# Patient Record
Sex: FEMALE | Race: WHITE | NOT HISPANIC OR LATINO | Employment: OTHER | URBAN - METROPOLITAN AREA
[De-identification: names, ages, dates, MRNs, and addresses within clinical notes are randomized per-mention and may not be internally consistent; named-entity substitution may affect disease eponyms.]

---

## 2017-01-05 ENCOUNTER — HOSPITAL ENCOUNTER (OUTPATIENT)
Dept: RADIOLOGY | Facility: HOSPITAL | Age: 62
Discharge: HOME | End: 2017-01-05
Attending: EMERGENCY MEDICINE
Payer: COMMERCIAL

## 2017-01-05 DIAGNOSIS — Z12.39 BREAST CANCER SCREENING: ICD-10-CM

## 2017-01-05 DIAGNOSIS — Z12.31 VISIT FOR SCREENING MAMMOGRAM: ICD-10-CM

## 2017-01-05 PROCEDURE — 77063 BREAST TOMOSYNTHESIS BI: CPT | Mod: 26,,, | Performed by: RADIOLOGY

## 2017-01-05 PROCEDURE — 77067 SCR MAMMO BI INCL CAD: CPT | Mod: TC

## 2017-01-05 PROCEDURE — 77067 SCR MAMMO BI INCL CAD: CPT | Mod: 26,,, | Performed by: RADIOLOGY

## 2017-03-13 ENCOUNTER — OFFICE VISIT (OUTPATIENT)
Dept: FAMILY MEDICINE | Facility: CLINIC | Age: 62
End: 2017-03-13
Payer: COMMERCIAL

## 2017-03-13 VITALS
OXYGEN SATURATION: 97 % | BODY MASS INDEX: 34.16 KG/M2 | HEART RATE: 70 BPM | WEIGHT: 217.63 LBS | HEIGHT: 67 IN | DIASTOLIC BLOOD PRESSURE: 90 MMHG | TEMPERATURE: 99 F | SYSTOLIC BLOOD PRESSURE: 130 MMHG

## 2017-03-13 DIAGNOSIS — J32.9 RHINOSINUSITIS: Primary | ICD-10-CM

## 2017-03-13 DIAGNOSIS — M26.609 TEMPOROMANDIBULAR JOINT DISORDER: ICD-10-CM

## 2017-03-13 DIAGNOSIS — R03.0 ELEVATED BLOOD PRESSURE READING: ICD-10-CM

## 2017-03-13 PROCEDURE — 99214 OFFICE O/P EST MOD 30 MIN: CPT | Mod: S$GLB,,, | Performed by: NURSE PRACTITIONER

## 2017-03-13 PROCEDURE — 99999 PR PBB SHADOW E&M-EST. PATIENT-LVL III: CPT | Mod: PBBFAC,,, | Performed by: NURSE PRACTITIONER

## 2017-03-13 PROCEDURE — 1160F RVW MEDS BY RX/DR IN RCRD: CPT | Mod: S$GLB,,, | Performed by: NURSE PRACTITIONER

## 2017-03-13 RX ORDER — CYCLOBENZAPRINE HCL 5 MG
5 TABLET ORAL 3 TIMES DAILY PRN
Qty: 20 TABLET | Refills: 0 | Status: SHIPPED | OUTPATIENT
Start: 2017-03-13 | End: 2017-03-23

## 2017-03-13 RX ORDER — AZITHROMYCIN 250 MG/1
TABLET, FILM COATED ORAL
Qty: 6 TABLET | Refills: 0 | Status: SHIPPED | OUTPATIENT
Start: 2017-03-13 | End: 2017-03-18

## 2017-03-13 NOTE — PROGRESS NOTES
Subjective:       Patient ID: Leny Tucker is a 61 y.o. female.    Chief Complaint: Nasal Congestion (yellow mucus x 3 days); Cough; and Otalgia    HPI   Patient in office with complaints of non productive cough, nasal congestion, itchy eyes, body aches over the last week. Sx have abruptly worsened in the last 3 days.   currently taking Tylenol, Zyrtec D, and Flonase daily with no sx relief   Denies fever, shortness of breath, chest pain.     States that she has a hx of TMJ, Right sided jaw pain x 1 month   Currently taking tylenol/advil for sx relief, with moderate pain relief   She has taken flexeril in the past with similar sx and requests a trial of the medication.     Elevated b/p reading - no hx of htn, not currently on any medications. She has been taking sudafed for current sx.     Review of Systems   Constitutional: Negative for chills and fever.   HENT: Positive for congestion, rhinorrhea and sore throat. Negative for ear pain and postnasal drip.    Eyes: Positive for itching. Negative for discharge and redness.   Respiratory: Negative for shortness of breath and wheezing.    Cardiovascular: Negative for chest pain, palpitations and leg swelling.   Gastrointestinal: Negative for constipation, diarrhea, nausea and vomiting.   Genitourinary: Negative for difficulty urinating.   Musculoskeletal: Positive for arthralgias.   Skin: Negative for rash.   Neurological: Positive for headaches. Negative for speech difficulty and weakness.   Psychiatric/Behavioral: Positive for sleep disturbance.       Objective:      Physical Exam   Constitutional: She is oriented to person, place, and time. She appears well-developed and well-nourished.   HENT:   Head: Normocephalic and atraumatic.   Right Ear: Hearing, external ear and ear canal normal. A middle ear effusion is present.   Left Ear: Hearing, external ear and ear canal normal. A middle ear effusion is present.   Nose: Nose normal.   Mouth/Throat: Uvula is midline,  oropharynx is clear and moist and mucous membranes are normal.       Eyes: Conjunctivae and EOM are normal. Pupils are equal, round, and reactive to light.   Neck: Normal range of motion. Neck supple.   Cardiovascular: Normal rate, regular rhythm, normal heart sounds and intact distal pulses.    Pulmonary/Chest: Effort normal and breath sounds normal.   Abdominal: Soft. Bowel sounds are normal.   Musculoskeletal: Normal range of motion.   Neurological: She is alert and oriented to person, place, and time.   Skin: Skin is warm and dry.   Psychiatric: She has a normal mood and affect. Her behavior is normal. Judgment and thought content normal.   Nursing note and vitals reviewed.      Assessment:       1. Rhinosinusitis    2. Temporomandibular joint disorder    3. Elevated blood pressure reading        Plan:   Leny was seen today for nasal congestion, cough and otalgia.    Diagnoses and all orders for this visit:    Rhinosinusitis  -     azithromycin (Z-VALENTIN) 250 MG tablet; Take 2 tablets by mouth on day 1; Take 1 tablet by mouth on days 2-5  Side effects and precautions of abx use reviewed with patient, expressed understanding. No questions or concerns.  Expectant management reviewed: increase fluid intake, otc analgesics prn, mucinex and antihistamines for sx relief. Call if sx persist or worsen.     Temporomandibular joint disorder  -     cyclobenzaprine (FLEXERIL) 5 MG tablet; Take 1 tablet (5 mg total) by mouth 3 (three) times daily as needed for Muscle spasms.  Side effects and precautions of medication use reviewed with patient, expressed understanding. No questions or concerns.  Rest, nsaids. May need to follow-up with dentist for persistent sx.     Elevated blood pressure reading  Avoid decongestants. Home b/p monitoring.   RTC to clinic for persistent elevations > 140/90.

## 2017-03-13 NOTE — MR AVS SNAPSHOT
[facility]  [address]  [facility] [address]  Phone: [phone]  Fax: [phone]                  Leny Tucker   3/13/2017 10:40 AM   Office Visit    Description:  Female : 1955   Provider:  Leny Peters NP   Department:  [facility]           Reason for Visit     Nasal Congestion     Cough     Otalgia           Diagnoses this Visit        Comments    Rhinosinusitis    -  Primary     Temporomandibular joint disorder         Elevated blood pressure reading                To Do List           Goals (5 Years of Data)              Today    Today    16    Blood Pressure < 130/80   130/90  130/90  130/90       These Medications        Disp Refills Start End    azithromycin (Z-VALENTIN) 250 MG tablet 6 tablet 0 3/13/2017 3/18/2017    Take 2 tablets by mouth on day 1; Take 1 tablet by mouth on days 2-5    Pharmacy: [facility] [employer] [address] [place] [address] AT [facility] Ph #: [phone]       cyclobenzaprine (FLEXERIL) 5 MG tablet 20 tablet 0 3/13/2017 3/23/2017    Take 1 tablet (5 mg total) by mouth 3 (three) times daily as needed for Muscle spasms. - Oral    Pharmacy: [facility] [place] [address] AT [address] [facility] & [facility] Ph #: [phone]         [facility]     [facility] Nurse Care Line -  Assistance  Registered nurses in the [facility] provide clinical advisement, health education, appointment booking, and other advisory services.  Call for this free service at [phone].             Medications           Message regarding Medications     Verify the changes and/or additions to your medication regime listed below are the same as discussed with your clinician today.  If any of these changes or additions are incorrect, please notify your healthcare provider.        START taking these NEW medications        Refills    azithromycin  "(Z-VALENTIN) 250 MG tablet 0    Sig: Take 2 tablets by mouth on day 1; Take 1 tablet by mouth on days 2-5    Class: Normal    cyclobenzaprine (FLEXERIL) 5 MG tablet 0    Sig: Take 1 tablet (5 mg total) by mouth 3 (three) times daily as needed for Muscle spasms.    Class: Normal    Route: Oral           Verify that the below list of medications is an accurate representation of the medications you are currently taking.  If none reported, the list may be blank. If incorrect, please contact your healthcare provider. Carry this list with you in case of emergency.           Current Medications     CETIRIZINE HCL/PSEUDOEPHEDRINE (ZYRTEC-D ORAL) Take 1 tablet by mouth once daily.    diclofenac sodium (VOLTAREN) 1 % Gel Apply 2 g topically 4 (four) times daily.    fluticasone (FLONASE) 50 mcg/actuation nasal spray 2 sprays by Each Nare route once daily.    ibuprofen (ADVIL,MOTRIN) 200 MG tablet Take 200 mg by mouth every 6 (six) hours as needed for Pain.    ketoconazole (NIZORAL) 2 % shampoo Apply topically once daily.    multivitamin capsule Take 1 capsule by mouth once daily.    ranitidine (ZANTAC) 150 MG tablet Take 1 tablet (150 mg total) by mouth 2 (two) times daily.    triamcinolone acetonide 0.1% (KENALOG) 0.1 % Lotn Apply topically 2 (two) times daily.    azithromycin (Z-VALENTIN) 250 MG tablet Take 2 tablets by mouth on day 1; Take 1 tablet by mouth on days 2-5    cyclobenzaprine (FLEXERIL) 5 MG tablet Take 1 tablet (5 mg total) by mouth 3 (three) times daily as needed for Muscle spasms.           Clinical Reference Information           Your Vitals Were     BP Pulse Temp Height Weight SpO2    130/90 (BP Location: Left arm, Patient Position: Sitting, BP Method: Manual) 70 99.1 °F (37.3 °C) (Oral) 5' 7" (1.702 m) 98.7 kg (217 lb 9.5 oz) 97%    BMI                34.08 kg/m2          Blood Pressure          Most Recent Value    BP  (!)  130/90      Allergies as of 3/13/2017     Augmentin [Amoxicillin-pot Clavulanate]    Sulfa " (Sulfonamide Antibiotics)      Immunizations Administered on Date of Encounter - 3/13/2017     None      Language Assistance Services     ATTENTION: Language assistance services are available, free of charge. Please call [phone].      ATENCIÓN: Si vannessa nielson, tiene a galvin disposición servicios gratuitos de asistencia lingüística. Llame al [phone].     [facility] Ý: N?u b?n nói Ti?ng Vi?t, có các d?ch v? h? tr? ngôn ng? mi?n phí dành cho b?n. G?i s? [phone].         [facility] complies with applicable Federal civil rights laws and does not discriminate on the basis of race, color, national origin, age, disability, or sex.

## 2017-05-17 ENCOUNTER — HOSPITAL ENCOUNTER (OUTPATIENT)
Dept: RADIOLOGY | Facility: HOSPITAL | Age: 62
Discharge: HOME | End: 2017-05-17
Attending: NURSE PRACTITIONER
Payer: COMMERCIAL

## 2017-05-17 ENCOUNTER — OFFICE VISIT (OUTPATIENT)
Dept: FAMILY MEDICINE | Facility: CLINIC | Age: 62
End: 2017-05-17
Payer: COMMERCIAL

## 2017-05-17 VITALS
HEART RATE: 82 BPM | HEIGHT: 67 IN | RESPIRATION RATE: 98 BRPM | BODY MASS INDEX: 34.88 KG/M2 | SYSTOLIC BLOOD PRESSURE: 150 MMHG | TEMPERATURE: 99 F | WEIGHT: 222.25 LBS | DIASTOLIC BLOOD PRESSURE: 100 MMHG

## 2017-05-17 DIAGNOSIS — G89.29 CHRONIC PAIN OF RIGHT KNEE: ICD-10-CM

## 2017-05-17 DIAGNOSIS — R03.0 ELEVATED BLOOD PRESSURE READING WITHOUT DIAGNOSIS OF HYPERTENSION: ICD-10-CM

## 2017-05-17 DIAGNOSIS — R50.9 FEVER, UNSPECIFIED FEVER CAUSE: Primary | ICD-10-CM

## 2017-05-17 DIAGNOSIS — M25.561 CHRONIC PAIN OF RIGHT KNEE: ICD-10-CM

## 2017-05-17 DIAGNOSIS — J01.10 ACUTE NON-RECURRENT FRONTAL SINUSITIS: ICD-10-CM

## 2017-05-17 DIAGNOSIS — J02.9 SORE THROAT: ICD-10-CM

## 2017-05-17 DIAGNOSIS — J34.89 SINUS PRESSURE: ICD-10-CM

## 2017-05-17 PROCEDURE — 73562 X-RAY EXAM OF KNEE 3: CPT | Mod: TC,PO,RT

## 2017-05-17 PROCEDURE — 1160F RVW MEDS BY RX/DR IN RCRD: CPT | Mod: S$GLB,,, | Performed by: NURSE PRACTITIONER

## 2017-05-17 PROCEDURE — 99999 PR PBB SHADOW E&M-EST. PATIENT-LVL IV: CPT | Mod: PBBFAC,,, | Performed by: NURSE PRACTITIONER

## 2017-05-17 PROCEDURE — 99214 OFFICE O/P EST MOD 30 MIN: CPT | Mod: S$GLB,,, | Performed by: NURSE PRACTITIONER

## 2017-05-17 PROCEDURE — 73562 X-RAY EXAM OF KNEE 3: CPT | Mod: 26,RT,, | Performed by: RADIOLOGY

## 2017-05-17 RX ORDER — DOXYCYCLINE 100 MG/1
100 CAPSULE ORAL 2 TIMES DAILY
Qty: 14 CAPSULE | Refills: 0 | Status: SHIPPED | OUTPATIENT
Start: 2017-05-17 | End: 2017-05-24

## 2017-05-17 RX ORDER — METHYLPREDNISOLONE 4 MG/1
TABLET ORAL
Qty: 1 PACKAGE | Refills: 0 | Status: SHIPPED | OUTPATIENT
Start: 2017-05-17 | End: 2017-06-01 | Stop reason: ALTCHOICE

## 2017-05-17 NOTE — PROGRESS NOTES
Subjective:       Patient ID: Leny Tucker is a 61 y.o. female.    Chief Complaint: Sinus Problem and Knee Pain (left knee)  She was last seen in primary care by FRANCO Peters NP on 03/13/2017. She last saw her PCP Dr. Sullivan on 12/16/2016. This is her first time seeing me in the clinic.  Sinus Problem   The current episode started 1 to 4 weeks ago (3 weeks). The problem has been rapidly worsening since onset. Maximum temperature: 99 at home. Associated symptoms include congestion, sinus pressure and a sore throat. Treatments tried: flonase, robitussin,    Knee Pain    The incident occurred more than 1 week ago (3 weeks). Injury mechanism: states injury in the 80's. The pain is present in the right knee. The quality of the pain is described as aching. The pain is at a severity of 8/10. The pain is moderate. The pain has been intermittent since onset. The symptoms are aggravated by weight bearing (bending knee). Treatments tried: voltaren. The treatment provided mild relief.   Not responding to OTC meds for sinus complaints for 3 weeks  Vitals:    05/17/17 0854   BP: (!) 150/100   Pulse: 82   Resp: (!) 98   Temp: 99.3 °F (37.4 °C)     BP Readings from Last 3 Encounters:   05/17/17 (!) 150/100   03/13/17 (!) 130/90   12/16/16 120/80     Review of Systems   HENT: Positive for congestion, sinus pressure and sore throat.      She states blood pressure elevation related to pain in knee and over-the-counter medications she is taking.  Objective:      Physical Exam   Constitutional: She is oriented to person, place, and time. She appears well-developed and well-nourished.   HENT:   Head: Normocephalic and atraumatic.   Right Ear: Hearing and external ear normal.   Left Ear: Hearing and external ear normal.   Nose: Sinus tenderness present. Right sinus exhibits frontal sinus tenderness. Left sinus exhibits frontal sinus tenderness.   States headache when puts head down  Tenderness around ethmoid and frontal sinus regions    Cardiovascular: Normal rate and regular rhythm.    Pulmonary/Chest: Effort normal.   Abdominal: Soft.   Musculoskeletal:        Legs:  Lymphadenopathy:        Head (right side): No submental, no submandibular, no tonsillar, no preauricular, no posterior auricular and no occipital adenopathy present.        Head (left side): No submental, no submandibular, no tonsillar, no preauricular, no posterior auricular and no occipital adenopathy present.     She has no cervical adenopathy.   Neurological: She is alert and oriented to person, place, and time.   Skin: Skin is warm, dry and intact.   Psychiatric: She has a normal mood and affect. Her speech is normal and behavior is normal. Judgment and thought content normal. Cognition and memory are normal.   Nursing note and vitals reviewed.      Assessment & Plan:       Fever, unspecified fever cause  -     doxycycline (VIBRAMYCIN) 100 MG Cap; Take 1 capsule (100 mg total) by mouth 2 (two) times daily.  Dispense: 14 capsule; Refill: 0    Elevated blood pressure reading without diagnosis of hypertension    Acute non-recurrent frontal sinusitis  -     methylPREDNISolone (MEDROL DOSEPACK) 4 mg tablet; use as directed  Dispense: 1 Package; Refill: 0    Sore throat  -     POCT Rapid Strep A    Sinus pressure  -     doxycycline (VIBRAMYCIN) 100 MG Cap; Take 1 capsule (100 mg total) by mouth 2 (two) times daily.  Dispense: 14 capsule; Refill: 0  -     methylPREDNISolone (MEDROL DOSEPACK) 4 mg tablet; use as directed  Dispense: 1 Package; Refill: 0    Chronic pain of right knee  -     X-Ray Knee 3 View Right; Future; Expected date: 5/17/17  -     methylPREDNISolone (MEDROL DOSEPACK) 4 mg tablet; use as directed  Dispense: 1 Package; Refill: 0    Stop all OTC meds except coricidin or delsym if needed  I have discussed with her my concerns about her elevated blood pressure reading today.      Return in about 2 weeks (around 5/31/2017), or if symptoms worsen or fail to improve.   See  PCP or Vera in 2 weeks with blood pressure readings  Xray today

## 2017-05-17 NOTE — MR AVS SNAPSHOT
[facility]  [address] [facility] [address]  [facility] [address]  Phone: [phone]  Fax: [phone]                  Leny Tucker   2017 8:40 AM   Office Visit    Description:  Female : 1955   Provider:  Deja Sandoval NP   Department:  [facility]           Reason for Visit     Sinus Problem     Knee Pain           Diagnoses this Visit        Comments    Fever, unspecified fever cause    -  Primary     Elevated blood pressure reading without diagnosis of hypertension         Acute non-recurrent frontal sinusitis         Sore throat         Sinus pressure         Chronic pain of right knee                To Do List           Future Appointments        Provider Department Dept Phone    2017 10:15 AM [facility] XR2 [facility] [phone]    2017 9:40 AM Deja Sandoval NP [facility] [phone]      Goals (5 Years of Data)              Today    3/13/17    3/13/17    Blood Pressure < 130/80   150/100  150/100  150/100      Follow-Up and Disposition     Return in about 2 weeks (around 2017), or if symptoms worsen or fail to improve.       These Medications        Disp Refills Start End    doxycycline (VIBRAMYCIN) 100 MG Cap 14 capsule 0 2017    Take 1 capsule (100 mg total) by mouth 2 (two) times daily. - Oral    Pharmacy: [facility] [employer] [address] [place] [address] AT [address] [facility]  [address] Ph #: [phone]       methylPREDNISolone (MEDROL DOSEPACK) 4 mg tablet 1 Package 0 2017    use as directed    Pharmacy: [facility] [employer] [address] [place] [address] AT [address] [facility]  [address] Ph #: [phone]         [facility]     [facility] On Call Nurse Care Line -  Assistance  Unless otherwise directed by your provider, please contact [facility], our nurse care line that is available for  assistance.     Registered nurses in the [facility] On  Call Center provide: appointment scheduling, clinical advisement, health education, and other advisory services.  Call: [phone] (toll free)               Medications           Message regarding Medications     Verify the changes and/or additions to your medication regime listed below are the same as discussed with your clinician today.  If any of these changes or additions are incorrect, please notify your healthcare provider.        START taking these NEW medications        Refills    doxycycline (VIBRAMYCIN) 100 MG Cap 0    Sig: Take 1 capsule (100 mg total) by mouth 2 (two) times daily.    Class: Normal    Route: Oral    methylPREDNISolone (MEDROL DOSEPACK) 4 mg tablet 0    Sig: use as directed    Class: Normal           Verify that the below list of medications is an accurate representation of the medications you are currently taking.  If none reported, the list may be blank. If incorrect, please contact your healthcare provider. Carry this list with you in case of emergency.           Current Medications     CETIRIZINE HCL/PSEUDOEPHEDRINE (ZYRTEC-D ORAL) Take 1 tablet by mouth once daily.    fluticasone (FLONASE) 50 mcg/actuation nasal spray 2 sprays by Each Nare route once daily.    ibuprofen (ADVIL,MOTRIN) 200 MG tablet Take 200 mg by mouth every 6 (six) hours as needed for Pain.    multivitamin capsule Take 1 capsule by mouth once daily.    ranitidine (ZANTAC) 150 MG tablet Take 1 tablet (150 mg total) by mouth 2 (two) times daily.    triamcinolone acetonide 0.1% (KENALOG) 0.1 % Lotn Apply topically 2 (two) times daily.    diclofenac sodium (VOLTAREN) 1 % Gel Apply 2 g topically 4 (four) times daily.    doxycycline (VIBRAMYCIN) 100 MG Cap Take 1 capsule (100 mg total) by mouth 2 (two) times daily.    ketoconazole (NIZORAL) 2 % shampoo Apply topically once daily.    methylPREDNISolone (MEDROL DOSEPACK) 4 mg tablet use as directed           Clinical Reference Information           Your Vitals Were      "BP Pulse Temp Resp Height Weight    150/100 82 99.3 °F (37.4 °C) (Oral) 98 5' 7" (1.702 m) 100.8 kg (222 lb 3.6 oz)    BMI                34.81 kg/m2          Blood Pressure          Most Recent Value    BP  (!)  150/100      Allergies as of 5/17/2017     Augmentin [Amoxicillin-pot Clavulanate]    Sulfa (Sulfonamide Antibiotics)      Immunizations Administered on Date of Encounter - 5/17/2017     None      Orders Placed During Today's Visit      Normal Orders This Visit    POCT Rapid Strep A     Future Labs/Procedures Expected by Expires    X-Ray Knee 3 View Right  5/17/2017 5/17/2018                  Instructions    Coricidin HBP for any cold symptoms as needed  Delsym cough as needed  To ED for any severe headache, visual impairment, mobility problems    Blood Pressure reading daily and record   Call to move appt up in 1 week if bottom number remains 100 or above    Discharge Instructions: Taking Your Blood Pressure  Blood pressure is the force of blood as it moves from the heart through the blood vessels. You can take your own blood pressure reading using a digital monitor. Take readings as often as your healthcare provider instructs. Take your readings each time in the same way, using the same arm. Here are guidelines for taking your blood pressure.  The American Heart Association (AHA) recommends purchasing a blood pressure monitor that is validated and approved by the Association for the Advancement of Medical Instrumentation, the [place] Hypertension Society, and the International Protocol for the Validation of Automated BP Measuring Devices. If the blood pressure monitor is for a senior adult, a pregnant woman, or a child, make certain it is validated for use with such a population. For the most reliable readings, the AHA recommends an automatic, cuff-style, upper arm (bicep) monitor. The readings from finger and wrist monitors are not as reliable as the upper arm monitor.        Step 1. Relax    · Wait at " least a half hour after smoking, eating, or exercising. Do not drink coffee, tea, soda, or other caffeinated beverages before checking your blood pressure.   · Sit comfortably at a table. Place the monitor near you.  · Rest for a few minutes before you begin.        Step 2. Wrap the cuff    · Place your arm on the table, palm up. Put your arm in a position that is level with your heart. Wrap the cuff around your upper arm, about an inch above your elbow. Its best to wrap the cuff on bare skin, not over clothing.  · Make sure your cuff fits. If it doesnt wrap around your upper arm, order a larger cuff. A cuff that is too large or too small can result in an inaccurate blood pressure reading.           Step 3. Inflate the cuff    · Pump the cuff until the scale reads 200. If you have a self-inflating cuff, push the button that starts the pump.  · The cuff will tighten, then loosen.  · The numbers will change. When they stop changing, your blood pressure reading will appear.  · If you get a reading that is too high or too low for you, relax for a few minutes. Then do the test again.    Step 4. Write down the results  · Write down your blood pressure numbers. Johnny the date and time. Keep your results in one place, such as a notebook.  · Remove the cuff from your arm. Turn off the machine.  · Take the readings with you to your medical appointments.  · If you start a new blood pressure medicine, or change a blood pressure medicine dose, note the day you started the new drug or dosage on your blood pressure recording sheet. This will help your healthcare provider monitor the effect of medication changes.     Date Last Reviewed: 4/27/2016  © 8221-6417 [employer]. [address] [place] [address], [place] [address]. All rights reserved. This information is not intended as a substitute for professional medical care. Always follow your healthcare professional's instructions.             Language Assistance Services      ATTENTION: Language assistance services are available, free of charge. Please call [phone].      ATENCIÓN: Si habla naga, tiene a galvin disposición servicios gratuitos de asistencia lingüística. Llame al [phone].     CHÚ Ý: N?u b?n nói Ti?ng Vi?t, có các d?ch v? h? tr? ngôn ng? mi?n phí dành cho b?n. G?i s? [phone].         [facility] complies with applicable Federal civil rights laws and does not discriminate on the basis of race, color, national origin, age, disability, or sex.

## 2017-05-17 NOTE — PATIENT INSTRUCTIONS
Coricidin HBP for any cold symptoms as needed  Delsym cough as needed  To ED for any severe headache, visual impairment, mobility problems    Blood Pressure reading daily and record   Call to move appt up in 1 week if bottom number remains 100 or above    Discharge Instructions: Taking Your Blood Pressure  Blood pressure is the force of blood as it moves from the heart through the blood vessels. You can take your own blood pressure reading using a digital monitor. Take readings as often as your healthcare provider instructs. Take your readings each time in the same way, using the same arm. Here are guidelines for taking your blood pressure.  The American Heart Association (AHA) recommends purchasing a blood pressure monitor that is validated and approved by the Association for the Advancement of Medical Instrumentation, the [place] Hypertension Society, and the International Protocol for the Validation of Automated BP Measuring Devices. If the blood pressure monitor is for a senior adult, a pregnant woman, or a child, make certain it is validated for use with such a population. For the most reliable readings, the AHA recommends an automatic, cuff-style, upper arm (bicep) monitor. The readings from finger and wrist monitors are not as reliable as the upper arm monitor.        Step 1. Relax    · Wait at least a half hour after smoking, eating, or exercising. Do not drink coffee, tea, soda, or other caffeinated beverages before checking your blood pressure.   · Sit comfortably at a table. Place the monitor near you.  · Rest for a few minutes before you begin.        Step 2. Wrap the cuff    · Place your arm on the table, palm up. Put your arm in a position that is level with your heart. Wrap the cuff around your upper arm, about an inch above your elbow. Its best to wrap the cuff on bare skin, not over clothing.  · Make sure your cuff fits. If it doesnt wrap around your upper arm, order a larger cuff. A cuff that is  too large or too small can result in an inaccurate blood pressure reading.           Step 3. Inflate the cuff    · Pump the cuff until the scale reads 200. If you have a self-inflating cuff, push the button that starts the pump.  · The cuff will tighten, then loosen.  · The numbers will change. When they stop changing, your blood pressure reading will appear.  · If you get a reading that is too high or too low for you, relax for a few minutes. Then do the test again.    Step 4. Write down the results  · Write down your blood pressure numbers. Johnny the date and time. Keep your results in one place, such as a notebook.  · Remove the cuff from your arm. Turn off the machine.  · Take the readings with you to your medical appointments.  · If you start a new blood pressure medicine, or change a blood pressure medicine dose, note the day you started the new drug or dosage on your blood pressure recording sheet. This will help your healthcare provider monitor the effect of medication changes.     Date Last Reviewed: 4/27/2016 © 2000-2016 [employer]. [address] [place] [address], [place] [address]. All rights reserved. This information is not intended as a substitute for professional medical care. Always follow your healthcare professional's instructions.

## 2017-05-18 ENCOUNTER — TELEPHONE (OUTPATIENT)
Dept: FAMILY MEDICINE | Facility: CLINIC | Age: 62
End: 2017-05-18

## 2017-05-18 DIAGNOSIS — G89.29 CHRONIC PAIN OF RIGHT KNEE: ICD-10-CM

## 2017-05-18 DIAGNOSIS — G89.29 CHRONIC PAIN OF RIGHT KNEE: Primary | ICD-10-CM

## 2017-05-18 DIAGNOSIS — M25.561 CHRONIC PAIN OF RIGHT KNEE: ICD-10-CM

## 2017-05-18 DIAGNOSIS — M25.561 CHRONIC PAIN OF RIGHT KNEE: Primary | ICD-10-CM

## 2017-05-18 RX ORDER — MELOXICAM 7.5 MG/1
7.5 TABLET ORAL DAILY
Qty: 30 TABLET | Refills: 1 | Status: SHIPPED | OUTPATIENT
Start: 2017-05-18 | End: 2017-05-18 | Stop reason: SDUPTHER

## 2017-05-18 NOTE — TELEPHONE ENCOUNTER
----- Message from Yojana Shelton sent at 5/18/2017  2:02 PM CDT -----  Contact: [phone] or [phone] or [phone] patient requesting results

## 2017-05-18 NOTE — TELEPHONE ENCOUNTER
----- Message from Santhosh Luque sent at 5/18/2017 11:00 AM CDT -----  Patient is calling for Test results. Please call patient at [phone]. Thanks!

## 2017-05-22 DIAGNOSIS — K21.9 GASTROESOPHAGEAL REFLUX DISEASE, ESOPHAGITIS PRESENCE NOT SPECIFIED: ICD-10-CM

## 2017-05-22 RX ORDER — MELOXICAM 7.5 MG/1
TABLET ORAL
Qty: 90 TABLET | Refills: 1 | Status: SHIPPED | OUTPATIENT
Start: 2017-05-22 | End: 2017-08-14 | Stop reason: SDUPTHER

## 2017-05-22 NOTE — TELEPHONE ENCOUNTER
----- Message from Destinee Davenport sent at 5/22/2017 10:02 AM CDT -----  Patient requested refill on  Zantac, call into [facility]  pharmacy at  below. Please call patient at  [phone] if you have any questions. Thank you.         [facility] [address] [place] [address] AT [facility] & [address] [address]  Phone: [phone] Fax: [phone]

## 2017-06-01 ENCOUNTER — OFFICE VISIT (OUTPATIENT)
Dept: FAMILY MEDICINE | Facility: CLINIC | Age: 62
End: 2017-06-01
Payer: COMMERCIAL

## 2017-06-01 VITALS
DIASTOLIC BLOOD PRESSURE: 90 MMHG | HEIGHT: 67 IN | SYSTOLIC BLOOD PRESSURE: 132 MMHG | HEART RATE: 86 BPM | RESPIRATION RATE: 16 BRPM | BODY MASS INDEX: 34.64 KG/M2 | WEIGHT: 220.69 LBS | TEMPERATURE: 98 F

## 2017-06-01 DIAGNOSIS — E66.09 NON MORBID OBESITY DUE TO EXCESS CALORIES: ICD-10-CM

## 2017-06-01 DIAGNOSIS — M17.0 PRIMARY OSTEOARTHRITIS OF BOTH KNEES: ICD-10-CM

## 2017-06-01 DIAGNOSIS — L55.9 SUNBURN: ICD-10-CM

## 2017-06-01 DIAGNOSIS — R03.0 ELEVATED BLOOD PRESSURE READING WITHOUT DIAGNOSIS OF HYPERTENSION: Primary | ICD-10-CM

## 2017-06-01 PROCEDURE — 99999 PR PBB SHADOW E&M-EST. PATIENT-LVL IV: CPT | Mod: PBBFAC,,, | Performed by: NURSE PRACTITIONER

## 2017-06-01 PROCEDURE — 99213 OFFICE O/P EST LOW 20 MIN: CPT | Mod: S$GLB,,, | Performed by: NURSE PRACTITIONER

## 2017-06-01 NOTE — PROGRESS NOTES
Subjective:       Patient ID: Leny Tucker is a 61 y.o. female.    Chief Complaint: Follow-up (2 wk fu fever)  She was last seen in primary care by me on 05/17/2017 with fever and elevated blood pressure.  HPI   She is here today for a follow up and with continued blood pressure monitoring.  She complains of mild sore throat but no fever  Vitals:    06/01/17 1035   BP:    Pulse:    Resp:    Temp: 98.2 °F (36.8 °C)     BP Readings from Last 3 Encounters:   06/01/17 (!) 132/90   05/17/17 (!) 150/100   03/13/17 (!) 130/90     Review of Systems   HENT: Positive for sore throat.        CMP  Sodium   Date Value Ref Range Status   12/16/2016 141 136 - 145 mmol/L Final     Potassium   Date Value Ref Range Status   12/16/2016 4.0 3.5 - 5.1 mmol/L Final     Chloride   Date Value Ref Range Status   12/16/2016 106 95 - 110 mmol/L Final     CO2   Date Value Ref Range Status   12/16/2016 28 23 - 29 mmol/L Final     Glucose   Date Value Ref Range Status   12/16/2016 83 70 - 110 mg/dL Final     BUN, Bld   Date Value Ref Range Status   12/16/2016 15 6 - 20 mg/dL Final     Creatinine   Date Value Ref Range Status   12/16/2016 0.9 0.5 - 1.4 mg/dL Final     Calcium   Date Value Ref Range Status   12/16/2016 9.1 8.7 - 10.5 mg/dL Final     Total Protein   Date Value Ref Range Status   12/16/2016 6.9 6.0 - 8.4 g/dL Final     Albumin   Date Value Ref Range Status   12/16/2016 3.9 3.5 - 5.2 g/dL Final     Total Bilirubin   Date Value Ref Range Status   12/16/2016 0.4 0.1 - 1.0 mg/dL Final     Comment:     For infants and newborns, interpretation of results should be based  on gestational age, weight and in agreement with clinical  observations.  Premature Infant recommended reference ranges:  Up to 24 hours.............<8.0 mg/dL  Up to 48 hours............<12.0 mg/dL  3-5 days..................<15.0 mg/dL  6-29 days.................<15.0 mg/dL       Alkaline Phosphatase   Date Value Ref Range Status   12/16/2016 90 55 - 135 U/L Final      AST   Date Value Ref Range Status   12/16/2016 22 10 - 40 U/L Final     ALT   Date Value Ref Range Status   12/16/2016 25 10 - 44 U/L Final     Anion Gap   Date Value Ref Range Status   12/16/2016 7 (L) 8 - 16 mmol/L Final     eGFR if    Date Value Ref Range Status   12/16/2016 >60.0 >60 mL/min/1.73 m^2 Final     eGFR if non    Date Value Ref Range Status   12/16/2016 >60.0 >60 mL/min/1.73 m^2 Final     Comment:     Calculation used to obtain the estimated glomerular filtration  rate (eGFR) is the CKD-EPI equation. Since race is unknown   in our information system, the eGFR values for   -American and Non--American patients are given   for each creatinine result.       States she had stomach ache with doxycycline and went to ED and had to stop it.  She is a non smoker and never been diagnosed prior with hypertension  Objective:      Physical Exam   Constitutional: She is oriented to person, place, and time. She appears well-developed and well-nourished.   HENT:   Head: Normocephalic and atraumatic.   Right Ear: External ear normal.   Left Ear: External ear normal.   Nose: Nose normal.   Mouth/Throat: Uvula is midline, oropharynx is clear and moist and mucous membranes are normal.   Neck: Normal range of motion. Neck supple.   Cardiovascular: Normal rate, regular rhythm and normal heart sounds.    Pulmonary/Chest: Effort normal and breath sounds normal.   Musculoskeletal: Normal range of motion.   Neurological: She is alert and oriented to person, place, and time.   Skin: Skin is warm and dry. Burn noted.        Psychiatric: She has a normal mood and affect. Her speech is normal and behavior is normal. Judgment and thought content normal. Cognition and memory are normal.   Nursing note and vitals reviewed.              Assessment & Plan:       Elevated blood pressure reading without diagnosis of hypertension    Sunburn    Non morbid obesity due to excess  calories    Primary osteoarthritis of both knees    No changes in medications today. She will continue with more lifestyle modification per her request until her next visit. Will consider need for medication changes at that appt. Her home blood pressure readings are improved.  No over the counter decongestants  Can take zyrtec or claritin without D  Change toothbrush  Continue daily blood pressure readings at home        Continue daily Aloe Vera to her legs  Return in about 4 weeks (around 6/29/2017), or if symptoms worsen or fail to improve.

## 2017-06-01 NOTE — PATIENT INSTRUCTIONS
No over the counter decongestants  Can take zyrtec or claritin without D  Change toothbrush  Continue daily blood pressure readings at home      Low-Salt Diet  This diet removes foods that are high in salt. It also limits the amount of salt you use when cooking. It is most often used for people with high blood pressure, edema (fluid retention), and kidney, liver, or heart disease.  Table salt contains the mineral sodium. Your body needs sodium to work normally. But too much sodium can make your health problems worse. Your healthcare provider is recommending a low-salt (also called low-sodium) diet for you. Your total daily allowance of salt is 1,500 to 2,300 milligrams (mg). It is less than 1 teaspoon of table salt. This means you can have only about 500 to 700 mg of sodium at each meal. People with certain health problems should limit salt intake to the lower end of the recommended range.    When you cook, dont add much salt. If you can cook without using salt, even better. Dont add salt to your food at the table.  When shopping, read food labels. Salt is often called sodium on the label. Choose foods that are salt-free, low salt, or very low salt. Note that foods with reduced salt may not lower your salt intake enough.    Beans, potatoes, and pasta  Ok: Dry beans, split peas, lentils, potatoes, rice, macaroni, pasta, spaghetti without added salt  Avoid: Potato chips, tortilla chips, and similar products  Breads and cereals  Ok: Low-sodium breads, rolls, cereals, and cakes; low-salt crackers, matzo crackers  Avoid: Salted crackers, pretzels, popcorn, [place] toast, pancakes, muffins  Dairy  Ok: Milk, chocolate milk, hot chocolate mix, low-salt cheeses, and yogurt  Avoid: Processed cheese and cheese spreads; Roquefort, Camembert, and cottage cheese; buttermilk, instant breakfast drink  Desserts  Ok: Ice cream, frozen yogurt, juice bars, gelatin, cookies and pies, sugar, honey, jelly, hard candy  Avoid: Most pies,  cakes and cookies prepared or processed with salt; instant pudding  Drinks  Ok: Tea, coffee, fizzy (carbonated) drinks, juices  Avoid: Flavored coffees, electrolyte replacement drinks, sports drinks  Meats  Ok: All fresh meat, fish, poultry, low-salt tuna, eggs, egg substitute  Avoid: Smoked, pickled, brine-cured, or salted meats and fish. This includes oreilly, chipped beef, corned beef, hot dogs, deli meats, ham, kosher meats, salt pork, sausage, canned tuna, salted codfish, smoked salmon, herring, sardines, or anchovies.  Seasonings and spices  Ok: Most seasonings are okay. Good substitutes for salt include: fresh herb blends, hot sauce, lemon, garlic, barebr, vinegar, dry mustard, parsley, cilantro, horseradish, tomato paste, regular margarine, mayonnaise, unsalted butter, cream cheese, vegetable oil, cream, low-salt salad dressing and gravy.  Avoid: Regular ketchup, relishes, pickles, soy sauce, teriyaki sauce, Worcestershire sauce, BBQ sauce, tartar sauce, meat tenderizer, chili sauce, regular gravy, regular salad dressing, salted butter  Soups  Ok: Low-salt soups and broths made with allowed foods  Avoid: Bouillon cubes, soups with smoked or salted meats, regular soup and broth  Vegetables  Ok: Most vegetables are okay; also low-salt tomato and vegetable juices  Avoid: Sauerkraut and other brine-soaked vegetables; pickles and other pickled vegetables; tomato juice, olives  Date Last Reviewed: 8/1/2016  © 2240-4898 [employer]. [address] [place] [address], [place] [address]. All rights reserved. This information is not intended as a substitute for professional medical care. Always follow your healthcare professional's instructions.        Discharge Instructions: Taking Your Blood Pressure  Blood pressure is the force of blood as it moves from the heart through the blood vessels. You can take your own blood pressure reading using a digital monitor. Take readings as often as your healthcare  provider instructs. Take your readings each time in the same way, using the same arm. Here are guidelines for taking your blood pressure.  The American Heart Association (AHA) recommends purchasing a blood pressure monitor that is validated and approved by the Association for the Advancement of Medical Instrumentation, the [place] Hypertension Society, and the International Protocol for the Validation of Automated BP Measuring Devices. If the blood pressure monitor is for a senior adult, a pregnant woman, or a child, make certain it is validated for use with such a population. For the most reliable readings, the AHA recommends an automatic, cuff-style, upper arm (bicep) monitor. The readings from finger and wrist monitors are not as reliable as the upper arm monitor.        Step 1. Relax    · Wait at least a half hour after smoking, eating, or exercising. Do not drink coffee, tea, soda, or other caffeinated beverages before checking your blood pressure.   · Sit comfortably at a table. Place the monitor near you.  · Rest for a few minutes before you begin.        Step 2. Wrap the cuff    · Place your arm on the table, palm up. Put your arm in a position that is level with your heart. Wrap the cuff around your upper arm, about an inch above your elbow. Its best to wrap the cuff on bare skin, not over clothing.  · Make sure your cuff fits. If it doesnt wrap around your upper arm, order a larger cuff. A cuff that is too large or too small can result in an inaccurate blood pressure reading.           Step 3. Inflate the cuff    · Pump the cuff until the scale reads 200. If you have a self-inflating cuff, push the button that starts the pump.  · The cuff will tighten, then loosen.  · The numbers will change. When they stop changing, your blood pressure reading will appear.  · If you get a reading that is too high or too low for you, relax for a few minutes. Then do the test again.    Step 4. Write down the  results  · Write down your blood pressure numbers. Johnny the date and time. Keep your results in one place, such as a notebook.  · Remove the cuff from your arm. Turn off the machine.  · Take the readings with you to your medical appointments.  · If you start a new blood pressure medicine, or change a blood pressure medicine dose, note the day you started the new drug or dosage on your blood pressure recording sheet. This will help your healthcare provider monitor the effect of medication changes.     Date Last Reviewed: 4/27/2016 © 2000-2016 [employer]. [address] [place] [address]. All rights reserved. This information is not intended as a substitute for professional medical care. Always follow your healthcare professional's instructions.

## 2017-06-01 NOTE — PROGRESS NOTES
30Subjective:       Patient ID: Leny Tucker is a 61 y.o. female.    Chief Complaint: Follow-up (2 wk fu fever)    HPI  Vitals:    06/01/17 1035   BP:    Pulse:    Resp:    Temp: 98.2 °F (36.8 °C)     Review of Systems    Objective:      Physical Exam    Assessment & Plan:       There are no diagnoses linked to this encounter.      No Follow-up on file.

## 2017-06-11 RX ORDER — FLUTICASONE PROPIONATE 50 MCG
SPRAY, SUSPENSION (ML) NASAL
Qty: 1 BOTTLE | Refills: 11 | Status: SHIPPED | OUTPATIENT
Start: 2017-06-11 | End: 2017-07-18 | Stop reason: SDUPTHER

## 2017-06-30 ENCOUNTER — OFFICE VISIT (OUTPATIENT)
Dept: FAMILY MEDICINE | Facility: CLINIC | Age: 62
End: 2017-06-30
Payer: COMMERCIAL

## 2017-06-30 VITALS
TEMPERATURE: 99 F | BODY MASS INDEX: 34.84 KG/M2 | RESPIRATION RATE: 16 BRPM | SYSTOLIC BLOOD PRESSURE: 148 MMHG | HEART RATE: 75 BPM | HEIGHT: 67 IN | DIASTOLIC BLOOD PRESSURE: 100 MMHG | WEIGHT: 222 LBS | OXYGEN SATURATION: 98 %

## 2017-06-30 DIAGNOSIS — M25.561 CHRONIC PAIN OF RIGHT KNEE: ICD-10-CM

## 2017-06-30 DIAGNOSIS — E66.09 NON MORBID OBESITY DUE TO EXCESS CALORIES: ICD-10-CM

## 2017-06-30 DIAGNOSIS — R03.0 ELEVATED BLOOD PRESSURE READING WITHOUT DIAGNOSIS OF HYPERTENSION: Primary | ICD-10-CM

## 2017-06-30 DIAGNOSIS — M17.11 ARTHRITIS OF RIGHT KNEE: ICD-10-CM

## 2017-06-30 DIAGNOSIS — R03.0 ELEVATED BLOOD PRESSURE READING WITHOUT DIAGNOSIS OF HYPERTENSION: ICD-10-CM

## 2017-06-30 DIAGNOSIS — G89.29 CHRONIC PAIN OF RIGHT KNEE: ICD-10-CM

## 2017-06-30 PROCEDURE — 99999 PR PBB SHADOW E&M-EST. PATIENT-LVL V: CPT | Mod: PBBFAC,,, | Performed by: NURSE PRACTITIONER

## 2017-06-30 PROCEDURE — 99214 OFFICE O/P EST MOD 30 MIN: CPT | Mod: S$GLB,,, | Performed by: NURSE PRACTITIONER

## 2017-06-30 RX ORDER — HYDROCHLOROTHIAZIDE 12.5 MG/1
TABLET ORAL
Qty: 90 TABLET | Refills: 0 | Status: SHIPPED | OUTPATIENT
Start: 2017-06-30 | End: 2017-07-18

## 2017-06-30 RX ORDER — HYDROCHLOROTHIAZIDE 12.5 MG/1
12.5 TABLET ORAL DAILY
Qty: 30 TABLET | Refills: 1 | Status: SHIPPED | OUTPATIENT
Start: 2017-06-30 | End: 2017-06-30 | Stop reason: SDUPTHER

## 2017-06-30 NOTE — PROGRESS NOTES
Subjective:       Patient ID: Leny Tucker is a 61 y.o. female.    Chief Complaint: Follow-up (4 week followup for elevated blood pressure)  I last saw he in primary care on 06/01/2017. She last saw her PCP Dr. Sullivan on 12/16/2016  HPI   She is her to follow up with her blood pressure readings.  Vitals:    06/30/17 0916   BP: (!) 148/100   Pulse: 75   Resp: 16   Temp: 98.8 °F (37.1 °C)     BP Readings from Last 3 Encounters:   06/30/17 (!) 148/100   06/01/17 (!) 132/90   05/17/17 (!) 150/100   150/90 left arm sitting manual cuff -per chantelle Sandoval      [facility]  Sodium   Date Value Ref Range Status   12/16/2016 141 136 - 145 mmol/L Final     Potassium   Date Value Ref Range Status   12/16/2016 4.0 3.5 - 5.1 mmol/L Final     Chloride   Date Value Ref Range Status   12/16/2016 106 95 - 110 mmol/L Final     CO2   Date Value Ref Range Status   12/16/2016 28 23 - 29 mmol/L Final     Glucose   Date Value Ref Range Status   12/16/2016 83 70 - 110 mg/dL Final     BUN, Bld   Date Value Ref Range Status   12/16/2016 15 6 - 20 mg/dL Final     Creatinine   Date Value Ref Range Status   12/16/2016 0.9 0.5 - 1.4 mg/dL Final     Calcium   Date Value Ref Range Status   12/16/2016 9.1 8.7 - 10.5 mg/dL Final     Total Protein   Date Value Ref Range Status   12/16/2016 6.9 6.0 - 8.4 g/dL Final     Albumin   Date Value Ref Range Status   12/16/2016 3.9 3.5 - 5.2 g/dL Final     Total Bilirubin   Date Value Ref Range Status   12/16/2016 0.4 0.1 - 1.0 mg/dL Final     Comment:     For infants and newborns, interpretation of results should be based  on gestational age, weight and in agreement with clinical  observations.  Premature Infant recommended reference ranges:  Up to 24 hours.............<8.0 mg/dL  Up to 48 hours............<12.0 mg/dL  3-5 days..................<15.0 mg/dL  6-29 days.................<15.0 mg/dL       Alkaline Phosphatase   Date Value Ref Range Status   12/16/2016 90 55 - 135 U/L Final     AST   Date Value Ref Range  Status   12/16/2016 22 10 - 40 U/L Final     ALT   Date Value Ref Range Status   12/16/2016 25 10 - 44 U/L Final     Anion Gap   Date Value Ref Range Status   12/16/2016 7 (L) 8 - 16 mmol/L Final     eGFR if    Date Value Ref Range Status   12/16/2016 >60.0 >60 mL/min/1.73 m^2 Final     eGFR if non    Date Value Ref Range Status   12/16/2016 >60.0 >60 mL/min/1.73 m^2 Final     Comment:     Calculation used to obtain the estimated glomerular filtration  rate (eGFR) is the CKD-EPI equation. Since race is unknown   in our information system, the eGFR values for   -American and Non--American patients are given   for each creatinine result.       Review of Systems   Respiratory: Negative for cough and shortness of breath.    Cardiovascular: Negative for chest pain and leg swelling.       Below are her blood pressure readings at home          She does not want to begin blood pressure medications because she states she know this is white coat syndrome.   I have discussed with her some higher than desired diastolic levels in her home readings    Objective:      Physical Exam   Constitutional: She is oriented to person, place, and time. She appears well-developed and well-nourished.   HENT:   Head: Normocephalic and atraumatic.   Neck: Normal range of motion. Neck supple.   Cardiovascular: Regular rhythm.    Pulmonary/Chest: Effort normal and breath sounds normal.   Neurological: She is alert and oriented to person, place, and time.   Skin: Skin is warm and dry.   Psychiatric: She has a normal mood and affect. Her behavior is normal. Judgment and thought content normal.   Nursing note and vitals reviewed.      Assessment & Plan:       Elevated blood pressure reading without diagnosis of hypertension  -     Discontinue: hydrochlorothiazide (HYDRODIURIL) 12.5 MG Tab; Take 1 tablet (12.5 mg total) by mouth once daily.  Dispense: 30 tablet; Refill: 1    Non morbid obesity due to  excess calories    Chronic pain of right knee  -     Ambulatory Referral to Physical/Occupational Therapy    Arthritis of right knee  -     Ambulatory Referral to Physical/Occupational Therapy    She states she is willing to try taking the HCTZ to see if it can improve her blood pressure  Banana, pineapple or raisins daily  Please drink water and hydrate well  Contact office for any cramping  Medication List with Changes/Refills   New Medications    HYDROCHLOROTHIAZIDE (HYDRODIURIL) 12.5 MG TAB    TAKE 1 TABLET(12.5 MG) BY MOUTH EVERY DAY   Current Medications    FLUTICASONE (FLONASE) 50 MCG/ACTUATION NASAL SPRAY    2 sprays by Each Nare route once daily.    FLUTICASONE (FLONASE) 50 MCG/ACTUATION NASAL SPRAY    USE TWO SPRAYS IN EACH NOSTRIL TWICE DAILY    KETOCONAZOLE (NIZORAL) 2 % SHAMPOO    Apply topically once daily.    MELOXICAM (MOBIC) 7.5 MG TABLET    TAKE 1 TABLET(7.5 MG) BY MOUTH EVERY DAY WITH FOOD. START AFTER COMPLETION OF MEDROL DOSEPAK    MULTIVITAMIN CAPSULE    Take 1 capsule by mouth once daily.    RANITIDINE (ZANTAC) 150 MG TABLET    Take 1 tablet (150 mg total) by mouth 2 (two) times daily.    TRIAMCINOLONE ACETONIDE 0.1% (KENALOG) 0.1 % LOTN    Apply topically 2 (two) times daily.           Return if symptoms worsen or fail to improve.   See SALLIE Myers in 3 weeks and her PCP at next available.

## 2017-06-30 NOTE — PATIENT INSTRUCTIONS
Banana, pineapple or raisins daily  Please drink water and hydrate well  Contact office for any cramping

## 2017-07-18 ENCOUNTER — OFFICE VISIT (OUTPATIENT)
Dept: FAMILY MEDICINE | Facility: CLINIC | Age: 62
End: 2017-07-18
Payer: COMMERCIAL

## 2017-07-18 VITALS
WEIGHT: 224.19 LBS | OXYGEN SATURATION: 98 % | DIASTOLIC BLOOD PRESSURE: 98 MMHG | BODY MASS INDEX: 35.19 KG/M2 | SYSTOLIC BLOOD PRESSURE: 146 MMHG | HEIGHT: 67 IN | HEART RATE: 84 BPM

## 2017-07-18 DIAGNOSIS — M25.561 RIGHT KNEE PAIN, UNSPECIFIED CHRONICITY: Primary | ICD-10-CM

## 2017-07-18 DIAGNOSIS — R03.0 ELEVATED BLOOD PRESSURE READING WITHOUT DIAGNOSIS OF HYPERTENSION: ICD-10-CM

## 2017-07-18 PROCEDURE — 99214 OFFICE O/P EST MOD 30 MIN: CPT | Mod: S$GLB,,, | Performed by: NURSE PRACTITIONER

## 2017-07-18 PROCEDURE — 99999 PR PBB SHADOW E&M-EST. PATIENT-LVL IV: CPT | Mod: PBBFAC,,, | Performed by: NURSE PRACTITIONER

## 2017-07-18 RX ORDER — HYDROCHLOROTHIAZIDE 25 MG/1
25 TABLET ORAL DAILY
Qty: 30 TABLET | Refills: 11 | Status: SHIPPED | OUTPATIENT
Start: 2017-07-18 | End: 2018-07-03 | Stop reason: SDUPTHER

## 2017-07-18 NOTE — MEDICAL/APP STUDENT
"Subjective:       Patient ID: Leny Tucker is a 61 y.o. female.    Chief Complaint: Hypertension and Knee Pain (R side)  Ms. Tucker is here today regarding recurrent right knee pain. She does not recall any recent injury, however, this past week she had increased activity level due to visiting her grandchildren. Since her return to [place], she has experience pain, swelling, and yesterday found it difficult to walk. Of note, in May she was seen for acute knee pain, imaging was completed, and she was informed she had Osteo-arthritis. She began taking Mobic, and was utilizing Voltaren cream. These had been working, until the flair this past week. OTC remedies attempted include Tylenol once, and ICE. Neither of which have provided any relief.    In regards to her blood pressure follow up, she has not been taking daily pressures and has not kept a log. She reports they run "135/80", and are higher when she is experiencing knee pain. Denies shortness of breath, headaches, dizziness, nausea or vomiting, or leg swelling.     Hypertension   This is a chronic problem. The problem is controlled. Associated symptoms include peripheral edema. Pertinent negatives include no anxiety, blurred vision, chest pain, headaches, malaise/fatigue, neck pain, orthopnea, palpitations, PND, shortness of breath or sweats. Risk factors for coronary artery disease include obesity and post-menopausal state. Past treatments include diuretics. The current treatment provides moderate improvement. Compliance problems include exercise and diet.    Knee Pain    The incident occurred more than 1 week ago. There was no injury mechanism. The pain is present in the right knee. The quality of the pain is described as aching. The pain is moderate. The pain has been intermittent since onset. Associated symptoms include a loss of motion. Pertinent negatives include no inability to bear weight, loss of sensation, muscle weakness, numbness or tingling. The symptoms " are aggravated by movement and weight bearing. She has tried ice, NSAIDs and rest for the symptoms. The treatment provided mild relief.     Review of Systems   Constitutional: Negative.  Negative for malaise/fatigue.   HENT: Positive for postnasal drip.         Sinus   Eyes: Negative for blurred vision, photophobia, pain and visual disturbance.   Respiratory: Positive for cough. Negative for shortness of breath and wheezing.         Sinus   Cardiovascular: Negative.  Negative for chest pain, palpitations, orthopnea, leg swelling and PND.   Gastrointestinal: Negative for abdominal pain, constipation, diarrhea, nausea and vomiting.   Endocrine: Negative for cold intolerance, heat intolerance and polydipsia.   Genitourinary: Negative for dyspareunia, flank pain, frequency, pelvic pain and urgency.   Musculoskeletal: Positive for arthralgias, gait problem and joint swelling. Negative for back pain, myalgias and neck pain.        All related to her right knee   Skin: Negative for color change, rash and wound.   Allergic/Immunologic: Positive for environmental allergies.   Neurological: Negative for dizziness, tingling, weakness, light-headedness, numbness and headaches.   Hematological: Negative for adenopathy. Does not bruise/bleed easily.       Objective:      Physical Exam   Constitutional: She is oriented to person, place, and time. She appears well-developed and well-nourished. No distress.   HENT:   Head: Normocephalic and atraumatic.   Eyes: Pupils are equal, round, and reactive to light.   Cardiovascular: Regular rhythm.    Pulmonary/Chest: Effort normal and breath sounds normal. She has no wheezes.   Musculoskeletal: She exhibits tenderness.        Right knee: She exhibits decreased range of motion and swelling. She exhibits no effusion, no ecchymosis, no deformity, no laceration, normal alignment and normal patellar mobility.        Legs:  Neurological: She is alert and oriented to person, place, and time.    Skin: Skin is warm and dry. Capillary refill takes less than 2 seconds. She is not diaphoretic.   Psychiatric: She has a normal mood and affect.   Vitals reviewed.      Assessment:         ICD-10-CM ICD-9-CM   1. Right knee pain, unspecified chronicity M25.561 719.46   2. Elevated blood pressure reading without diagnosis of hypertension R03.0 796.2       Plan:       Right knee pain, unspecified chronicity  -     Ambulatory referral to Orthopedics    Elevated blood pressure reading without diagnosis of hypertension  -     hydrochlorothiazide (HYDRODIURIL) 25 MG tablet; Take 1 tablet (25 mg total) by mouth once daily.  Dispense: 30 tablet; Refill: 11

## 2017-07-18 NOTE — PROGRESS NOTES
Subjective:       Patient ID: Leny Tucker is a 61 y.o. female.    Chief Complaint: Hypertension and Knee Pain (R side)    Patient was seen on 6/30/17. HCTZ started for BP elevation. No log available. Has not been monitoring it regularly. Has been compliant with medication.   Patient is here for Bp recheck but is mainly concerned regarding right knee pain. She had a problem with this knee about 30 years ago, no pain in the knee since then until May, xray and Voltaren gel were prescribed in may,  no resolution of pain, no recent significant injury.6/30/17  referred to PT for right knee pain, patient has not been able to go due to insurance reasons, also treated with Mobic, had good improvement for about 2 weeks, began to worsen while on a vacation and being very active last week.     Much worse since Sunday. Still taking Mobic. Afer a drive on Sunday had swelling and extreme pain with bending. 8/10 with weight bearing and movement. Painful to position last night,  10/10. Currently while sitting at rest 0/10.   TETANUS VACCINE due on 09/24/1975    Past Medical History:  Past Medical History:  No date: Allergy to pollen  No date: GERD (gastroesophageal reflux disease)  Past Surgical History:  No date: FINGER SURGERY      Comment: bone lesion  No date: HYSTERECTOMY  No date: LIPOMA RESECTION      Comment: biopsy negative  Review of patient's allergies indicates:   -- Augmentin (amoxicillin-pot clavulanate) -- Rash   -- Sulfa (sulfonamide antibiotics) -- Rash  Current Outpatient Prescriptions on File Prior to Visit:  fluticasone (FLONASE) 50 mcg/actuation nasal spray, 2 sprays by Each Nare route once daily., Disp: 1 Bottle, Rfl: 11  hydrochlorothiazide (HYDRODIURIL) 12.5 MG Tab, TAKE 1 TABLET(12.5 MG) BY MOUTH EVERY DAY, Disp: 90 tablet, Rfl: 0  ketoconazole (NIZORAL) 2 % shampoo, Apply topically once daily., Disp: 120 mL, Rfl: 6  meloxicam (MOBIC) 7.5 MG tablet, TAKE 1 TABLET(7.5 MG) BY MOUTH EVERY DAY WITH FOOD. START  AFTER COMPLETION OF MEDROL DOSEPAK, Disp: 90 tablet, Rfl: 1  multivitamin capsule, Take 1 capsule by mouth once daily., Disp: , Rfl:   ranitidine (ZANTAC) 150 MG tablet, Take 1 tablet (150 mg total) by mouth 2 (two) times daily., Disp: 180 tablet, Rfl: 3  triamcinolone acetonide 0.1% (KENALOG) 0.1 % Lotn, Apply topically 2 (two) times daily., Disp: 60 mL, Rfl: 3  (DISCONTINUED) fluticasone (FLONASE) 50 mcg/actuation nasal spray, USE TWO SPRAYS IN EACH NOSTRIL TWICE DAILY, Disp: 1 Bottle, Rfl: 11    No current facility-administered medications on file prior to visit.     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  Three times a week.  15 minutes.      Review of patient's family history indicates:    Hypertension                   Brother                   Hypertension                   Brother                   Heart disease                  Father                    Rheumatic fever                Mother                    Arthritis                      Mother                      Comment: rheumatoid arthritis    Heart disease                  Mother                              Review of Systems   Constitutional:  Negative.    Respiratory: Negative.  Negative for cough and shortness of breath.    Cardiovascular: Negative.  Negative for chest pain and leg swelling.   Gastrointestinal: Negative.  Negative for abdominal pain, constipation and diarrhea.   Genitourinary: Negative.  Negative for dysuria.   Musculoskeletal: Positive for arthralgias.   Neurological: Negative.        Objective:      Physical Exam   Constitutional: She is oriented to person, place, and time. No distress.   HENT:   Head: Normocephalic and atraumatic.   Eyes: Pupils are equal, round, and reactive to light.   Cardiovascular: Normal rate and regular rhythm.  Exam reveals no friction rub.    No murmur heard.  Pulmonary/Chest: Effort normal and breath sounds normal. No respiratory distress. She has no wheezes.   Abdominal: Soft. Bowel sounds are normal. She exhibits no distension. There is no tenderness.   Musculoskeletal:        Right knee: She exhibits decreased range of motion and swelling.   Neurological: She is alert and oriented to person, place, and time.   Skin: She is not diaphoretic. No erythema.   Psychiatric: She has a normal mood and affect. Her behavior is normal.   Vitals reviewed.      Assessment:       1. Right knee pain, unspecified chronicity    2. Elevated blood pressure reading without diagnosis of hypertension        Plan:       1. Elevated blood pressure reading without diagnosis of hypertension  Increase HCTZ to 25 mg daily, monitor and record Bp daily, follow up in 2 weeks for bmp and bp recheck, sooner for any new or worsening symptoms.  - hydrochlorothiazide (HYDRODIURIL) 25 MG tablet; Take 1 tablet (25 mg total) by mouth once daily.  Dispense: 30 tablet; Refill: 11    2. Right knee pain, unspecified chronicity    - Ambulatory referral to Orthopedics'

## 2017-07-19 ENCOUNTER — OFFICE VISIT (OUTPATIENT)
Dept: ORTHOPEDICS | Facility: CLINIC | Age: 62
End: 2017-07-19
Payer: COMMERCIAL

## 2017-07-19 VITALS
WEIGHT: 224 LBS | SYSTOLIC BLOOD PRESSURE: 148 MMHG | BODY MASS INDEX: 35.16 KG/M2 | HEART RATE: 100 BPM | HEIGHT: 67 IN | DIASTOLIC BLOOD PRESSURE: 88 MMHG

## 2017-07-19 DIAGNOSIS — S83.241A ACUTE MEDIAL MENISCAL TEAR, RIGHT, INITIAL ENCOUNTER: Primary | ICD-10-CM

## 2017-07-19 PROCEDURE — 99999 PR PBB SHADOW E&M-EST. PATIENT-LVL III: CPT | Mod: PBBFAC,,, | Performed by: ORTHOPAEDIC SURGERY

## 2017-07-19 PROCEDURE — 20610 DRAIN/INJ JOINT/BURSA W/O US: CPT | Mod: RT,S$GLB,, | Performed by: ORTHOPAEDIC SURGERY

## 2017-07-19 PROCEDURE — 99203 OFFICE O/P NEW LOW 30 MIN: CPT | Mod: 25,S$GLB,, | Performed by: ORTHOPAEDIC SURGERY

## 2017-07-19 RX ORDER — TRIAMCINOLONE ACETONIDE 40 MG/ML
40 INJECTION, SUSPENSION INTRA-ARTICULAR; INTRAMUSCULAR
Status: DISCONTINUED | OUTPATIENT
Start: 2017-07-19 | End: 2017-07-19 | Stop reason: HOSPADM

## 2017-07-19 RX ADMIN — TRIAMCINOLONE ACETONIDE 40 MG: 40 INJECTION, SUSPENSION INTRA-ARTICULAR; INTRAMUSCULAR at 03:07

## 2017-07-19 NOTE — PROGRESS NOTES
Past Medical History:   Diagnosis Date    Allergy to pollen     GERD (gastroesophageal reflux disease)        Past Surgical History:   Procedure Laterality Date    FINGER SURGERY      bone lesion    HYSTERECTOMY      LIPOMA RESECTION      biopsy negative       Current Outpatient Prescriptions   Medication Sig    fluticasone (FLONASE) 50 mcg/actuation nasal spray 2 sprays by Each Nare route once daily.    hydrochlorothiazide (HYDRODIURIL) 25 MG tablet Take 1 tablet (25 mg total) by mouth once daily.    ketoconazole (NIZORAL) 2 % shampoo Apply topically once daily.    meloxicam (MOBIC) 7.5 MG tablet TAKE 1 TABLET(7.5 MG) BY MOUTH EVERY DAY WITH FOOD. START AFTER COMPLETION OF MEDROL DOSEPAK    multivitamin capsule Take 1 capsule by mouth once daily.    ranitidine (ZANTAC) 150 MG tablet Take 1 tablet (150 mg total) by mouth 2 (two) times daily.    triamcinolone acetonide 0.1% (KENALOG) 0.1 % Lotn Apply topically 2 (two) times daily.     No current facility-administered medications for this visit.        Review of patient's allergies indicates:   Allergen Reactions    Doxycycline     Augmentin [amoxicillin-pot clavulanate] Rash    Sulfa (sulfonamide antibiotics) Rash       Family History   Problem Relation Age of Onset    Hypertension Brother     Hypertension Brother     Heart disease Father     Rheumatic fever Mother     Arthritis Mother      rheumatoid arthritis    Heart disease Mother        Social History     Social History    Marital status:      Spouse name: N/A    Number of children: 2    Years of education: N/A     Occupational History    RN [employer]      [employer] [facility]     Social History Main Topics    Smoking status: Former Smoker     Packs/day: 1.50     Years: 10.00     Quit date: 4/2/1993    Smokeless tobacco: Never Used    Alcohol use No    Drug use: No    Sexual activity: Not Currently     Partners:  Male     Other Topics Concern    Not on file     Social History Narrative    Walking for exercise.  Three times a week.  15 minutes.       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  Patient's had pain now for about 2 months.  No injury or trauma.  Increased pain with flexion and twisting.  Patient was originally started on some oral steroids and some topical cream but it didn't really help.  She started some meloxicam which helped for 2 weeks but the pain is returned.  Pain is medially located.  No swelling or mechanical symptoms.  Pain as a 6 out of 10.      Review of Systems:    Constitution: Negative for chills, fever, and sweats.  Negative for unexplained weight loss.    HENT:  Negative for headaches and blurry vision.    Cardiovascular:Negative for chest pain or irregular heart beat. Negative for hypertension.    Respiratory:  Negative for cough and shortness of breath.    Gastrointestinal: Negative for abdominal pain, heartburn, melena, nausea, and vomitting.    Genitourinary:  Negative bladder incontinence and dysuria.    Musculoskeletal:  See HPI    Neurological: Negative for numbness.    Psychiatric/Behavioral: Negative for depression.  The patient is not nervous/anxious.      Endocrine: Negative for polyuria    Hematologic/Lymphatic: Negative for bleeding problem.  Does not bruise/bleed easily.    Skin: Negative for poor would healing and rash      Physical Examination:    Vital Signs:    Vitals:    07/19/17 1438   BP: (!) 148/88   Pulse: 100       Body mass index is 35.08 kg/m².    This a well-developed, well nourished patient in no acute distress.  They are alert and oriented and cooperative to examination.  Pt. walks without an antalgic gait.      Examination of the right knee shows no rashes or erythema. There are no masses ecchymosis or effusion. Patient has full range  of motion from 0-130°. Patient is nontender to palpation over lateral joint line and moderately tender to palpation over the medial joint line. Patient has a - Lachman exam, - anterior drawer exam, and - posterior drawer exam. - Cate's exam. Knee is stable to varus and valgus stress. 5 out of 5 motor strength. Palpable distal pulses. Intact light touch sensation. Negative Patellofemoral crepitus    Examination of the left knee shows no rashes or erythema. There are no masses ecchymosis or effusion. Patient has full range of motion from 0-130°. Patient is nontender to palpation over lateral joint line and nontender to palpation over the medial joint line. Patient has a - Lachman exam, - anterior drawer exam, and - posterior drawer exam. - Cate's exam. Knee is stable to varus and valgus stress. 5 out of 5 motor strength. Palpable distal pulses. Intact light touch sensation. Negative Patellofemoral crepitus    X-rays: X-rays of the right knee are ordered and reviewed which show some mild arthritic changes with medial narrowing     Assessment:: Right knee arthritis versus medial meniscal tear    Plan:  I reviewed the findings with her today.  I recommended trying an intra-articular cortisone injection.  Talked about possibly getting an MRI if the shot does not help.  Follow-up as needed.    This note was created using Dragon voice recognition software that occasionally misinterpreted phrases or words.    Consult note is delivered via Epic messaging service.

## 2017-07-19 NOTE — LETTER
July 19, 2017      Kimberly Myers, SALLIE  [address] [facility] [address]           [facility]  [address] [facility] [address] [place] [address]  Phone: [phone]          Patient: Leny Tucker   MR Number: [id number]   YOB: 1955   Date of Visit: 7/19/2017       Dear Kimberly Myers:    Thank you for referring Leny Tucker to me for evaluation. Attached you will find relevant portions of my assessment and plan of care.    If you have questions, please do not hesitate to call me. I look forward to following Leny Tucker along with you.    Sincerely,    Dylan Carlos MD    Enclosure  CC:  No Recipients    If you would like to receive this communication electronically, please contact [email or web address] or [phone] to request more information on [employer] access.    For providers and/or their staff who would like to refer a patient to [facility], please contact us through our one-stop-shop provider referral line, Linnette Roman, at [phone].    If you feel you have received this communication in error or would no longer like to receive these types of communications, please e-mail [email or web address]

## 2017-07-19 NOTE — PROCEDURES
Large Joint Aspiration/Injection  Date/Time: 7/19/2017 3:02 PM  Performed by: MARTIN DUMONT  Authorized by: MARTIN DUMONT     Consent Done?:  Yes (Verbal)  Indications:  Pain  Procedure site marked: Yes    Timeout: Prior to procedure the correct patient, procedure, and site was verified      Location:  Knee  Site:  R knee  Prep: Patient was prepped and draped in usual sterile fashion    Needle size:  20 G  Approach:  Anterolateral  Medications:  40 mg triamcinolone acetonide 40 mg/mL  Patient tolerance:  Patient tolerated the procedure well with no immediate complications

## 2017-07-27 DIAGNOSIS — G89.29 CHRONIC PAIN OF RIGHT KNEE: ICD-10-CM

## 2017-07-27 DIAGNOSIS — M25.561 CHRONIC PAIN OF RIGHT KNEE: ICD-10-CM

## 2017-07-27 RX ORDER — MELOXICAM 7.5 MG/1
7.5 TABLET ORAL DAILY PRN
Qty: 30 TABLET | Refills: 0 | Status: SHIPPED | OUTPATIENT
Start: 2017-07-27 | End: 2017-07-27 | Stop reason: SDUPTHER

## 2017-07-27 RX ORDER — MELOXICAM 7.5 MG/1
TABLET ORAL
Qty: 30 TABLET | Refills: 0 | Status: SHIPPED | OUTPATIENT
Start: 2017-07-27 | End: 2017-08-14 | Stop reason: SDUPTHER

## 2017-08-14 ENCOUNTER — TELEPHONE (OUTPATIENT)
Dept: FAMILY MEDICINE | Facility: CLINIC | Age: 62
End: 2017-08-14

## 2017-08-14 ENCOUNTER — OFFICE VISIT (OUTPATIENT)
Dept: FAMILY MEDICINE | Facility: CLINIC | Age: 62
End: 2017-08-14
Payer: COMMERCIAL

## 2017-08-14 VITALS
WEIGHT: 222.25 LBS | OXYGEN SATURATION: 97 % | DIASTOLIC BLOOD PRESSURE: 80 MMHG | BODY MASS INDEX: 34.88 KG/M2 | SYSTOLIC BLOOD PRESSURE: 122 MMHG | HEIGHT: 67 IN | RESPIRATION RATE: 18 BRPM | HEART RATE: 71 BPM

## 2017-08-14 DIAGNOSIS — M54.50 ACUTE LEFT-SIDED LOW BACK PAIN WITHOUT SCIATICA: ICD-10-CM

## 2017-08-14 DIAGNOSIS — M25.561 CHRONIC PAIN OF RIGHT KNEE: ICD-10-CM

## 2017-08-14 DIAGNOSIS — G89.29 CHRONIC PAIN OF RIGHT KNEE: ICD-10-CM

## 2017-08-14 DIAGNOSIS — I10 ESSENTIAL HYPERTENSION: Primary | ICD-10-CM

## 2017-08-14 PROCEDURE — 99999 PR PBB SHADOW E&M-EST. PATIENT-LVL IV: CPT | Mod: PBBFAC,,, | Performed by: EMERGENCY MEDICINE

## 2017-08-14 PROCEDURE — 99213 OFFICE O/P EST LOW 20 MIN: CPT | Mod: S$GLB,,, | Performed by: EMERGENCY MEDICINE

## 2017-08-14 PROCEDURE — 3008F BODY MASS INDEX DOCD: CPT | Mod: S$GLB,,, | Performed by: EMERGENCY MEDICINE

## 2017-08-14 RX ORDER — MELOXICAM 7.5 MG/1
TABLET ORAL
Qty: 90 TABLET | Refills: 3 | Status: SHIPPED | OUTPATIENT
Start: 2017-08-14 | End: 2017-08-22 | Stop reason: SDUPTHER

## 2017-08-14 NOTE — PROGRESS NOTES
Subjective:   THIS NOTE IS DONE WITH VOICE RECOGNITION        Patient ID: Leny Tucker is a 61 y.o. female.    Chief Complaint: Blood Pressure Check      HPI     She is in to follow-up on her blood pressure.  She's doing well.  She is not any chest pain or palpitations.  She has not had any syncope.  She has been checking her blood pressure at home and work.  She is 100% control.  Hypertension    BP Readings from Last 3 Encounters:   08/14/17 122/80   07/19/17 (!) 148/88   07/18/17 (!) 146/98     She has not had any chest pain.    She is experiencing issues with her knees.  She recently had a right knee steroid injection.  The knee pain is better at this point.  She has not had any definitive locking or snapping of the knee.  If her knee pain continues, it has been suggested that further evaluation with MRI may be necessary.    She currently is experiencing lower left back pain.  This developed after she was pushing a medication card at work.  It happened almost immediately.  It's nonradiating.  There was note from a in the sense of a fall or a children of any type.  She has been using some moist heat.  This does appear to be getting better.  No bowel or bladder dysfunction.      She is also experiencing a puncture wound in the last couple months.  She did receive a Tdap through the emergency room.    Immunization History   Administered Date(s) Administered    Hepatitis B, Adult 12/16/2016    Tdap 06/14/2017         Current Outpatient Prescriptions   Medication Sig Dispense Refill    fluticasone (FLONASE) 50 mcg/actuation nasal spray 2 sprays by Each Nare route once daily. 1 Bottle 11    hydrochlorothiazide (HYDRODIURIL) 25 MG tablet Take 1 tablet (25 mg total) by mouth once daily. 30 tablet 11    ketoconazole (NIZORAL) 2 % shampoo Apply topically once daily. 120 mL 6    meloxicam (MOBIC) 7.5 MG tablet TAKE 1 TABLET(7.5 MG) BY MOUTH DAILY WITH FOOD AS NEEDED FOR PAIN 30 tablet 0    multivitamin capsule Take  1 capsule by mouth once daily.      ranitidine (ZANTAC) 150 MG tablet Take 1 tablet (150 mg total) by mouth 2 (two) times daily. 180 tablet 3    triamcinolone acetonide 0.1% (KENALOG) 0.1 % Lotn Apply topically 2 (two) times daily. 60 mL 3     No current facility-administered medications for this visit.          Review of Systems   Constitutional: Negative for activity change, appetite change, chills, diaphoresis, fatigue, fever and unexpected weight change.   HENT: Negative for congestion, ear pain, hearing loss, rhinorrhea, trouble swallowing and voice change.    Eyes: Negative for pain and visual disturbance.   Respiratory: Negative for cough, chest tightness and shortness of breath.    Cardiovascular: Negative for chest pain, palpitations and leg swelling.   Gastrointestinal: Negative for abdominal distention, abdominal pain and blood in stool.   Genitourinary: Negative for difficulty urinating, flank pain, frequency and urgency.   Musculoskeletal: Negative for arthralgias, back pain, joint swelling, myalgias, neck pain and neck stiffness.   Skin: Negative for pallor and rash.   Neurological: Negative for dizziness, tremors, syncope, weakness and headaches.   Hematological: Negative for adenopathy.   Psychiatric/Behavioral: Negative for dysphoric mood and sleep disturbance. The patient is not nervous/anxious.        Objective:      Physical Exam   Constitutional: She is oriented to person, place, and time. She appears well-developed and well-nourished. No distress.   HENT:   Head: Normocephalic and atraumatic.   Nose: Nose normal.   Mouth/Throat: Oropharynx is clear and moist.   Eyes: Conjunctivae and EOM are normal. Pupils are equal, round, and reactive to light. No scleral icterus.   Neck: Normal range of motion. Neck supple. No JVD present. No thyromegaly present.   Cardiovascular: Normal rate, regular rhythm, normal heart sounds and intact distal pulses.    No murmur heard.  Pulmonary/Chest: Effort normal  and breath sounds normal. She has no wheezes. She has no rales.   Abdominal: Soft. Bowel sounds are normal. She exhibits no distension. There is no tenderness.   Musculoskeletal: Normal range of motion. She exhibits no edema or tenderness.   Direct straight leg raising is negative.  There is no bony tenderness.  The area of discomfort is predominantly in the paraspinous area on her left side and just above the SI joint.   Lymphadenopathy:     She has no cervical adenopathy.   Neurological: She is alert and oriented to person, place, and time. She has normal reflexes. No cranial nerve deficit. Coordination normal.   Skin: Skin is warm and dry. No rash noted. No erythema.   Psychiatric: She has a normal mood and affect. Her behavior is normal.   Vitals reviewed.      Assessment:       1. Essential hypertension    2. Chronic pain of right knee    3. Acute left-sided low back pain without sciatica        Plan:       1. Essential hypertension  Update laboratory  Continue hydrochlorothiazide  Continue to monitor blood pressure  - Comprehensive metabolic panel; Future    2. Chronic pain of right knee  Likely degenerative arthritic in origin  Cannot rule out meniscus injury  Continue Mobic at low dose  If her knee pain increases or she develops physical symptoms such as locking, MRI may be necessary.    - meloxicam (MOBIC) 7.5 MG tablet; TAKE 1 TABLET(7.5 MG) BY MOUTH DAILY WITH FOOD AS NEEDED FOR PAIN  Dispense: 90 tablet; Refill: 3    3. Acute left-sided low back pain without sciatica  Acute  Moist heat  Mobic may help this.  Continue moving  If change in symptomatology or failure to resolve, she'll get back to us.

## 2017-08-14 NOTE — TELEPHONE ENCOUNTER
----- Message from Jacqueline Bianchi sent at 8/14/2017  1:26 PM CDT -----  Contact: self  Needs to know how many Mobic's to take-1 or 2 a day.  Please call back at [phone] (home)

## 2017-08-14 NOTE — TELEPHONE ENCOUNTER
Confirmed with patient that prescription states that she take one tablet po daily as needed for pain. Patient verbalized understanding.

## 2017-08-19 DIAGNOSIS — M25.561 CHRONIC PAIN OF RIGHT KNEE: ICD-10-CM

## 2017-08-19 DIAGNOSIS — R03.0 ELEVATED BLOOD PRESSURE READING WITHOUT DIAGNOSIS OF HYPERTENSION: ICD-10-CM

## 2017-08-19 DIAGNOSIS — G89.29 CHRONIC PAIN OF RIGHT KNEE: ICD-10-CM

## 2017-08-20 RX ORDER — FLUTICASONE PROPIONATE 50 MCG
SPRAY, SUSPENSION (ML) NASAL
Qty: 1 BOTTLE | Refills: 11 | Status: SHIPPED | OUTPATIENT
Start: 2017-08-20 | End: 2018-09-10 | Stop reason: SDUPTHER

## 2017-08-21 RX ORDER — HYDROCHLOROTHIAZIDE 12.5 MG/1
TABLET ORAL
Qty: 90 TABLET | Refills: 1 | OUTPATIENT
Start: 2017-08-21

## 2017-08-22 RX ORDER — MELOXICAM 7.5 MG/1
TABLET ORAL
Qty: 30 TABLET | Refills: 11 | Status: SHIPPED | OUTPATIENT
Start: 2017-08-22 | End: 2018-09-10 | Stop reason: SDUPTHER

## 2017-08-22 NOTE — TELEPHONE ENCOUNTER
Please check with her.  In reviewing her chart, it appears she is on 25 mg of hydrochlorothiazide, not 12.5 mg.

## 2017-08-22 NOTE — TELEPHONE ENCOUNTER
Spoke with pt she said she is on HCTZ 25  Mg. She said she has enough she said she needs a script for mobic 30 day supply her insurance wont pay for 90 day supply

## 2017-11-30 ENCOUNTER — OFFICE VISIT (OUTPATIENT)
Dept: FAMILY MEDICINE | Facility: CLINIC | Age: 62
End: 2017-11-30
Payer: COMMERCIAL

## 2017-11-30 VITALS
OXYGEN SATURATION: 97 % | HEIGHT: 67 IN | BODY MASS INDEX: 34.81 KG/M2 | TEMPERATURE: 101 F | WEIGHT: 221.81 LBS | RESPIRATION RATE: 17 BRPM | DIASTOLIC BLOOD PRESSURE: 78 MMHG | HEART RATE: 99 BPM | SYSTOLIC BLOOD PRESSURE: 118 MMHG

## 2017-11-30 DIAGNOSIS — J40 BRONCHITIS: ICD-10-CM

## 2017-11-30 DIAGNOSIS — R50.9 FEVER, UNSPECIFIED FEVER CAUSE: Primary | ICD-10-CM

## 2017-11-30 LAB
BILIRUB UR QL STRIP: NEGATIVE
CLARITY UR: CLEAR
COLOR UR: YELLOW
FLUAV AG SPEC QL IA: NEGATIVE
FLUBV AG SPEC QL IA: NEGATIVE
GLUCOSE UR QL STRIP: NEGATIVE
HGB UR QL STRIP: NEGATIVE
KETONES UR QL STRIP: NEGATIVE
LEUKOCYTE ESTERASE UR QL STRIP: NEGATIVE
NITRITE UR QL STRIP: NEGATIVE
PH UR STRIP: 7 [PH] (ref 5–8)
PROT UR QL STRIP: ABNORMAL
SP GR UR STRIP: <=1.005 (ref 1–1.03)
SPECIMEN SOURCE: NORMAL
URN SPEC COLLECT METH UR: ABNORMAL

## 2017-11-30 PROCEDURE — 99213 OFFICE O/P EST LOW 20 MIN: CPT | Mod: S$GLB,,, | Performed by: NURSE PRACTITIONER

## 2017-11-30 PROCEDURE — 87147 CULTURE TYPE IMMUNOLOGIC: CPT

## 2017-11-30 PROCEDURE — 87088 URINE BACTERIA CULTURE: CPT

## 2017-11-30 PROCEDURE — 87086 URINE CULTURE/COLONY COUNT: CPT

## 2017-11-30 PROCEDURE — 81003 URINALYSIS AUTO W/O SCOPE: CPT | Mod: PO

## 2017-11-30 PROCEDURE — 87400 INFLUENZA A/B EACH AG IA: CPT | Mod: 59,PO

## 2017-11-30 PROCEDURE — 99999 PR PBB SHADOW E&M-EST. PATIENT-LVL V: CPT | Mod: PBBFAC,,, | Performed by: NURSE PRACTITIONER

## 2017-11-30 RX ORDER — AZITHROMYCIN 250 MG/1
250 TABLET, FILM COATED ORAL DAILY
Qty: 6 TABLET | Refills: 0 | Status: SHIPPED | OUTPATIENT
Start: 2017-11-30 | End: 2017-12-05

## 2017-11-30 RX ORDER — PROMETHAZINE HYDROCHLORIDE AND DEXTROMETHORPHAN HYDROBROMIDE 6.25; 15 MG/5ML; MG/5ML
5 SYRUP ORAL 3 TIMES DAILY PRN
Qty: 240 ML | Refills: 0 | Status: SHIPPED | OUTPATIENT
Start: 2017-11-30 | End: 2017-12-10

## 2017-11-30 RX ORDER — ALBUTEROL SULFATE 90 UG/1
2 AEROSOL, METERED RESPIRATORY (INHALATION) EVERY 6 HOURS PRN
Qty: 18 G | Refills: 0 | Status: SHIPPED | OUTPATIENT
Start: 2017-11-30 | End: 2022-05-16

## 2017-11-30 NOTE — PROGRESS NOTES
Subjective:       Patient ID: Leny Tucker is a 62 y.o. female.    Chief Complaint: Fatigue (fever since sunday)    Fever, sinus drainage, cough, chest congestion since Sunday. Fever up to 102. Taking tylenol, zyrtec and flonase without much relief. Has noticed a non itching rash today.   Influenza Vaccine due on 08/01/2017  Zoster Vaccine due on 09/24/2017    Past Medical History:  Past Medical History:  No date: Allergy to pollen  No date: GERD (gastroesophageal reflux disease)  Past Surgical History:  No date: FINGER SURGERY      Comment: bone lesion  No date: HYSTERECTOMY  No date: LIPOMA RESECTION      Comment: biopsy negative  Review of patient's allergies indicates:   -- Doxycycline    -- Augmentin (amoxicillin-pot clavulanate) -- Rash   -- Sulfa (sulfonamide antibiotics) -- Rash  Current Outpatient Prescriptions on File Prior to Visit:  fluticasone (FLONASE) 50 mcg/actuation nasal spray, SPRAY TWICE INTO EACH NOSTRIL TWICE DAILY, Disp: 1 Bottle, Rfl: 11  hydrochlorothiazide (HYDRODIURIL) 25 MG tablet, Take 1 tablet (25 mg total) by mouth once daily., Disp: 30 tablet, Rfl: 11  ketoconazole (NIZORAL) 2 % shampoo, Apply topically once daily., Disp: 120 mL, Rfl: 6  meloxicam (MOBIC) 7.5 MG tablet, TAKE 1 TABLET(7.5 MG) BY MOUTH DAILY WITH FOOD AS NEEDED FOR PAIN, Disp: 30 tablet, Rfl: 11  multivitamin capsule, Take 1 capsule by mouth once daily., Disp: , Rfl:   ranitidine (ZANTAC) 150 MG tablet, Take 1 tablet (150 mg total) by mouth 2 (two) times daily., Disp: 180 tablet, Rfl: 3  triamcinolone acetonide 0.1% (KENALOG) 0.1 % Lotn, Apply topically 2 (two) times daily., Disp: 60 mL, Rfl: 3    No current facility-administered medications on file prior to visit.     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  Three times a week.  15 minutes.      Review of patient's family history indicates:    Hypertension                   Brother                   Hypertension                   Brother                   Heart disease                  Father                    Rheumatic fever                Mother                    Arthritis                      Mother                      Comment: rheumatoid arthritis    Heart disease                  Mother                            Review of Systems   Constitutional: Positive for chills, diaphoresis, fatigue and fever.   HENT: Positive for postnasal drip, rhinorrhea and sinus pressure. Negative for sore throat.    Respiratory: Positive for cough, choking and wheezing. Negative for shortness of breath and stridor.    Genitourinary: Negative.    Musculoskeletal: Positive for myalgias.   Skin: Positive for rash.   Neurological: Negative for dizziness, light-headedness and headaches.       Objective:      Physical Exam    Assessment:       1. Fever, unspecified fever cause    2. Bronchitis        Plan:     Flu negative. Due to fever and respiratory exam will treat with z-pack, albuterol, and phenergan dm. Follow up if not resolving or for worsening.   1. Fever, unspecified fever cause    - Urinalysis  - Urine culture  - Influenza antigen Nasal Swab  - azithromycin (ZITHROMAX Z-VALENTIN) 250 MG tablet;  Take 1 tablet (250 mg total) by mouth once daily. 2 pills on day 1, then 1 pill daily days 2-5  Dispense: 6 tablet; Refill: 0  - promethazine-dextromethorphan (PROMETHAZINE-DM) 6.25-15 mg/5 mL Syrp; Take 5 mLs by mouth 3 (three) times daily as needed.  Dispense: 240 mL; Refill: 0  - albuterol 90 mcg/actuation inhaler; Inhale 2 puffs into the lungs every 6 (six) hours as needed for Wheezing. Rescue  Dispense: 18 g; Refill: 0    2. Bronchitis    - azithromycin (ZITHROMAX Z-AVLENTIN) 250 MG tablet; Take 1 tablet (250 mg total) by mouth once daily. 2 pills on day 1, then 1 pill daily days 2-5  Dispense: 6 tablet; Refill: 0  - promethazine-dextromethorphan (PROMETHAZINE-DM) 6.25-15 mg/5 mL Syrp; Take 5 mLs by mouth 3 (three) times daily as needed.  Dispense: 240 mL; Refill: 0  - albuterol 90 mcg/actuation inhaler; Inhale 2 puffs into the lungs every 6 (six) hours as needed for Wheezing. Rescue  Dispense: 18 g; Refill: 0

## 2017-12-01 LAB — BACTERIA UR CULT: NORMAL

## 2017-12-15 ENCOUNTER — LAB VISIT (OUTPATIENT)
Dept: LAB | Facility: HOSPITAL | Age: 62
End: 2017-12-15
Attending: EMERGENCY MEDICINE
Payer: COMMERCIAL

## 2017-12-15 DIAGNOSIS — I10 ESSENTIAL HYPERTENSION: ICD-10-CM

## 2017-12-15 LAB
ALBUMIN SERPL BCP-MCNC: 3.6 G/DL
ALP SERPL-CCNC: 72 U/L
ALT SERPL W/O P-5'-P-CCNC: 30 U/L
ANION GAP SERPL CALC-SCNC: 7 MMOL/L
AST SERPL-CCNC: 25 U/L
BILIRUB SERPL-MCNC: 0.6 MG/DL
BUN SERPL-MCNC: 16 MG/DL
CALCIUM SERPL-MCNC: 8.9 MG/DL
CHLORIDE SERPL-SCNC: 107 MMOL/L
CO2 SERPL-SCNC: 28 MMOL/L
CREAT SERPL-MCNC: 0.9 MG/DL
EST. GFR  (AFRICAN AMERICAN): >60 ML/MIN/1.73 M^2
EST. GFR  (NON AFRICAN AMERICAN): >60 ML/MIN/1.73 M^2
GLUCOSE SERPL-MCNC: 77 MG/DL
POTASSIUM SERPL-SCNC: 3.8 MMOL/L
PROT SERPL-MCNC: 6.6 G/DL
SODIUM SERPL-SCNC: 142 MMOL/L

## 2017-12-15 PROCEDURE — 80053 COMPREHEN METABOLIC PANEL: CPT

## 2017-12-15 PROCEDURE — 36415 COLL VENOUS BLD VENIPUNCTURE: CPT | Mod: PO

## 2018-06-07 DIAGNOSIS — K21.9 GASTROESOPHAGEAL REFLUX DISEASE, ESOPHAGITIS PRESENCE NOT SPECIFIED: ICD-10-CM

## 2018-07-03 DIAGNOSIS — R03.0 ELEVATED BLOOD PRESSURE READING WITHOUT DIAGNOSIS OF HYPERTENSION: ICD-10-CM

## 2018-07-04 RX ORDER — HYDROCHLOROTHIAZIDE 25 MG/1
25 TABLET ORAL DAILY
Qty: 30 TABLET | Refills: 11 | Status: SHIPPED | OUTPATIENT
Start: 2018-07-04 | End: 2019-07-02 | Stop reason: SDUPTHER

## 2018-07-04 RX ORDER — FLUTICASONE PROPIONATE 50 MCG
SPRAY, SUSPENSION (ML) NASAL
Qty: 16 ML | Refills: 0 | Status: SHIPPED | OUTPATIENT
Start: 2018-07-04 | End: 2018-08-09 | Stop reason: SDUPTHER

## 2018-07-05 ENCOUNTER — LAB VISIT (OUTPATIENT)
Dept: LAB | Facility: HOSPITAL | Age: 63
End: 2018-07-05
Attending: EMERGENCY MEDICINE
Payer: COMMERCIAL

## 2018-07-05 DIAGNOSIS — R03.0 ELEVATED BLOOD PRESSURE READING WITHOUT DIAGNOSIS OF HYPERTENSION: ICD-10-CM

## 2018-07-05 LAB
ALBUMIN SERPL BCP-MCNC: 4.1 G/DL
ANION GAP SERPL CALC-SCNC: 10 MMOL/L
BUN SERPL-MCNC: 15 MG/DL
CALCIUM SERPL-MCNC: 9.3 MG/DL
CHLORIDE SERPL-SCNC: 104 MMOL/L
CO2 SERPL-SCNC: 29 MMOL/L
CREAT SERPL-MCNC: 1 MG/DL
EST. GFR  (AFRICAN AMERICAN): >60 ML/MIN/1.73 M^2
EST. GFR  (NON AFRICAN AMERICAN): >60 ML/MIN/1.73 M^2
GLUCOSE SERPL-MCNC: 96 MG/DL
PHOSPHATE SERPL-MCNC: 4.1 MG/DL
POTASSIUM SERPL-SCNC: 3.8 MMOL/L
SODIUM SERPL-SCNC: 143 MMOL/L

## 2018-07-05 PROCEDURE — 80069 RENAL FUNCTION PANEL: CPT

## 2018-07-05 PROCEDURE — 36415 COLL VENOUS BLD VENIPUNCTURE: CPT | Mod: PN

## 2018-08-09 ENCOUNTER — OFFICE VISIT (OUTPATIENT)
Dept: PRIMARY CARE CLINIC | Facility: CLINIC | Age: 63
End: 2018-08-09
Payer: COMMERCIAL

## 2018-08-09 VITALS
BODY MASS INDEX: 35.59 KG/M2 | TEMPERATURE: 99 F | SYSTOLIC BLOOD PRESSURE: 132 MMHG | HEART RATE: 80 BPM | HEIGHT: 67 IN | WEIGHT: 226.75 LBS | DIASTOLIC BLOOD PRESSURE: 90 MMHG

## 2018-08-09 DIAGNOSIS — R09.81 NASAL CONGESTION: ICD-10-CM

## 2018-08-09 DIAGNOSIS — L72.3 SEBACEOUS CYST: Primary | ICD-10-CM

## 2018-08-09 PROCEDURE — 3008F BODY MASS INDEX DOCD: CPT | Mod: CPTII,S$GLB,, | Performed by: NURSE PRACTITIONER

## 2018-08-09 PROCEDURE — 3075F SYST BP GE 130 - 139MM HG: CPT | Mod: CPTII,S$GLB,, | Performed by: NURSE PRACTITIONER

## 2018-08-09 PROCEDURE — 99213 OFFICE O/P EST LOW 20 MIN: CPT | Mod: S$GLB,,, | Performed by: NURSE PRACTITIONER

## 2018-08-09 PROCEDURE — 99999 PR PBB SHADOW E&M-EST. PATIENT-LVL V: CPT | Mod: PBBFAC,,, | Performed by: NURSE PRACTITIONER

## 2018-08-09 PROCEDURE — 3080F DIAST BP >= 90 MM HG: CPT | Mod: CPTII,S$GLB,, | Performed by: NURSE PRACTITIONER

## 2018-08-09 RX ORDER — CETIRIZINE HYDROCHLORIDE 10 MG/1
10 TABLET ORAL DAILY
COMMUNITY

## 2018-08-09 RX ORDER — AZELASTINE 1 MG/ML
1 SPRAY, METERED NASAL 2 TIMES DAILY
Qty: 30 ML | Refills: 0 | Status: SHIPPED | OUTPATIENT
Start: 2018-08-09 | End: 2022-05-16

## 2018-08-09 NOTE — PATIENT INSTRUCTIONS
Use warm compresses to the red cyst.  Dermatology can remove that and thus cure the problem.  Appointment set and you're on the cancellation list.      Epidermoid Cyst (Sebaceous Cyst), No Infection  An epidermoid cyst (sebaceous cyst) is a term that refers to 2 similar types of cysts: those found in the skin (epidermoid), and those found around hair follicles (pilar).  Some general facts about these cysts:  · A cyst is a sac filled with material that is often cheesy, fatty, oily, or fibrous. The material in them can be thick (like cottage cheese) or liquid.  · They form slowly under the skin, and can be found on most parts of the body. They are most often found in hairier areas like the scalp, face, upper back, and genitals.  · You can usually move them slightly if you try.  · They can be smaller than a pea or as large as a few inches.  · They are usually not painful, unless they become inflamed or infected.  · The area around the cyst may smell bad. If the cyst breaks open, the material inside it often smells bad too.  Causes  Epidermoid cysts are caused when skin (epidermal) cells move under the skin surface, or are covered over by it. These cells continue to multiply, like skin does normally. They then form a wall around themselves (cyst) and secrete normal skin fluids (keratin). This may be developmental. But it often happens because of an injury to the skin.  Epidermoid cysts are often found around hair follicles. These follicles are like cysts, but they have openings. Normal lubricating oils for your hair are sent out through these openings. A cyst occurs when an opening becomes blocked or the site inflamed. This often occurs when there is damage to the hair follicles by a scrape or wound.    Pilar cysts are similar to epidermoid cysts. But they start from a different part of the hair follicle, and are more likely to be on the scalp.  Symptoms  · Feeling a lump just beneath the skin  · It may or may not be  painful  · The cyst may or may not smell bad  · The cyst may become inflamed or red  · The cyst may leak fluid or thick material  Home care  Epidermoid cysts often go away without any treatment. If your cyst doesnt go away, and it bothers you, it may be drained or removed. If the cyst drains on its own, it may return. Resist the temptation to squeeze, pop, stick a needle in it, or cut it open. This often leads to an infection and scarring. If it gets severely inflamed or infected, you should seek medical care. Be sure to clean the cyst area when bathing or showering. Watch for the signs of infection listed below.  Follow-up care  Follow up with your healthcare provider, or as advised.  When to seek medical advice  Call your healthcare provider right away if any of these occur:  · Swelling, redness, or pain  · Pus coming from the cyst  Date Last Reviewed: 8/1/2016  © 2149-1459 [employer]. [address] [place] [address]. All rights reserved. This information is not intended as a substitute for professional medical care. Always follow your healthcare professional's instructions..    For the sinus congestion:  Add saline nasal spray through out the day.  Use astelin 1-2x per day, especially at bedtime to dry the drip.    If you have any questions, please call.  You can reach us at [phone] Monday through Thursday (except holidays) 10 a.m. to 6 p.m. or call Dr. Sullivan/SALLIE Myers    Thank you for using the [facility]!    ABRAN Garcia, CNP, FNP-BC [facility]    To rate your experience with MITCHEL Garcia, click on the link below:        [email or web address]

## 2018-08-09 NOTE — PROGRESS NOTES
"Leny Tucker is a 62 y.o. female patient of Kulwant Sullivan Jr, MD who presents to the clinic today for   Chief Complaint   Patient presents with    Recurrent Skin Infections     on back   .    HPI    Pt, who is not known to me, reports a new problem to me:  Recurrence of a past skin problem, about 1 inch below the bra strap, tender, raised, not mobile like her many lipomas.  Came in and had someone look at it in the past. Had [facility] open it up, packed it and they put a stitch in it.  Got AB.  That resolved it at the time.    Has some rash on the left side, as well.    These symptoms began 2 days ago and status is more tender.     Pt denies the following symptoms:  Fever, drainage    Aggravating factors include touching it .    Relieving factors include hot shower .    OTC Medications tried are nothing.  Washing it with soap and water.      Prescription medications taken for symptoms are none.    Pertinent medical history:  H/o lesion in the same area that was opened up in 2015 but an [facility] provider    ROS    Constitutional:  No fever.    Skin:  See chief complaint/HPI.    HEENT:  Having sinus problems, L>R.  Left eye more "teary", left nostril more congest.  Goes to the eye doctor every year.  No sinus pain but feels swelling, has a dry cough.    Heart/Lungs:  No complaints    GI/:  No sxs.  Takes Zantac prn.    MS:  The high humidity makes her joint ache.  Taking mobic.      PAST MEDICAL HISTORY:  Past Medical History:   Diagnosis Date    Allergy to pollen     GERD (gastroesophageal reflux disease)        PAST SURGICAL HISTORY:  Past Surgical History:   Procedure Laterality Date    FINGER SURGERY      bone lesion    HYSTERECTOMY      LIPOMA RESECTION      biopsy negative       SOCIAL HISTORY:  Social History     Social History    Marital status:      Spouse name: N/A    Number of children: 2    Years of education: N/A     Occupational History    RN [employer]      [facility] [employer] [facility] [employer]     Social History Main Topics    Smoking status: Former Smoker     Packs/day: 1.50     Years: 10.00     Quit date: 4/2/1993    Smokeless tobacco: Never Used    Alcohol use No    Drug use: No    Sexual activity: Not Currently     Partners: Male     Other Topics Concern    Not on file     Social History Narrative    Walking for exercise.  Three times a week.  15 minutes.       FAMILY HISTORY:  Family History   Problem Relation Age of Onset    Hypertension Brother     Hypertension Brother     Heart disease Father     Rheumatic fever Mother     Arthritis Mother         rheumatoid arthritis    Heart disease Mother        ALLERGIES AND MEDICATIONS: updated and reviewed.  Review of patient's allergies indicates:   Allergen Reactions    Doxycycline     Augmentin [amoxicillin-pot clavulanate] Rash    Sulfa (sulfonamide antibiotics) Rash     Current Outpatient Prescriptions   Medication Sig Dispense Refill    cetirizine (ZYRTEC) 10 MG tablet Take 10 mg by mouth once daily.      fluticasone (FLONASE) 50 mcg/actuation nasal spray SPRAY TWICE INTO EACH NOSTRIL TWICE DAILY 1 Bottle 11    hydroCHLOROthiazide (HYDRODIURIL) 25 MG tablet Take 1 tablet (25 mg total) by mouth once daily. 30 tablet 11    ketoconazole (NIZORAL) 2 % shampoo Apply topically once daily. 120 mL 6    meloxicam (MOBIC) 7.5 MG tablet TAKE 1 TABLET(7.5 MG) BY MOUTH DAILY WITH FOOD AS NEEDED FOR PAIN 30 tablet 11    multivitamin capsule Take 1 capsule by mouth once daily.      ranitidine (ZANTAC) 150 MG tablet TAKE 1 TABLET BY MOUTH TWICE A  tablet 0    triamcinolone acetonide 0.1% (KENALOG) 0.1 % Lotn Apply topically 2 (two) times daily. 60 mL 3    albuterol 90 mcg/actuation inhaler Inhale 2 puffs into the lungs every 6 (six) hours as needed for Wheezing. Rescue 18 g 0     No current facility-administered medications for this visit.          PHYSICAL EXAM    Alert, coop 62 y.o.  female patient in no acute distress, is not ill-appearing.    Vitals:    08/09/18 1037   BP: (!) 132/90   Pulse: 80   Temp: 98.7 °F (37.1 °C)     VS reviewed.  VS stable.  CC, nursing note, medications & PMH all reviewed today.    Head:  Normocephalic, atraumatic.    EENT:  Ext nose/ears normal.               TMs with diffuse cones of light bilat, no erythema, no effusions bilat.               Pharynx not injected.  Tonsils not enlarged, no exudate.               No cervical lymphadenopathy noted.               No sinus tenderness to palp.               Eye lids normal, no discharge present.        Resp:  Respirations even, unlabored    Heart:  Regular rate.   CV:  Cap RF brisk    MS:  Ambulates normally.     NEURO:  Alert and oriented x 4.  Responds appropriately during interaction.    Skin:  Warm, dry, color good.            A/an small (0.5 cm), oval-shaped, firm, erythematous lesion is noted in the mid-scapular line, under the inferior bra line            No pustules or vesicles noted.    Psych:  Responds appropriately throughout the visit.               Relaxed.  Well-groomed    Sebaceous cyst  Comments:  skin, recurrent  Orders:  -     Ambulatory referral to Dermatology    Nasal congestion  -     azelastine (ASTELIN) 137 mcg (0.1 %) nasal spray; 1 spray (137 mcg total) by Nasal route 2 (two) times daily.  Dispense: 30 mL; Refill: 0      Pt today presents with c/o firm, sore lesion on her back in a location where she had a lesion, that needed to be opened and drained (maybe 2015), in the past.   Additionally, she has a lot of nasal congestion, runny nose and drip.    This is a new problem to me.  No work up is planned.       Pt advised to perform comfort measures recommended on patient instruction sheet .    If worse or concerns, the patient is advised to call us.  Explained exam findings, diagnosis and treatment plan to patient.  Questions answered and patient states understanding.

## 2018-08-09 NOTE — Clinical Note
Kulwant Sullivan Jr, MD,  I saw your patient today in the [facility].  If you have any questions, please do not hesitate to contact me.  Thank you!  MITCHEL Garcia

## 2018-08-16 ENCOUNTER — INITIAL CONSULT (OUTPATIENT)
Dept: DERMATOLOGY | Facility: CLINIC | Age: 63
End: 2018-08-16
Payer: COMMERCIAL

## 2018-08-16 VITALS — WEIGHT: 226 LBS | BODY MASS INDEX: 35.47 KG/M2 | HEIGHT: 67 IN

## 2018-08-16 DIAGNOSIS — L72.0 EPIDERMAL INCLUSION CYST: Primary | ICD-10-CM

## 2018-08-16 DIAGNOSIS — L82.1 SEBORRHEIC KERATOSES: ICD-10-CM

## 2018-08-16 PROCEDURE — 87070 CULTURE OTHR SPECIMN AEROBIC: CPT

## 2018-08-16 PROCEDURE — 3008F BODY MASS INDEX DOCD: CPT | Mod: CPTII,S$GLB,, | Performed by: DERMATOLOGY

## 2018-08-16 PROCEDURE — 99202 OFFICE O/P NEW SF 15 MIN: CPT | Mod: S$GLB,,, | Performed by: DERMATOLOGY

## 2018-08-16 PROCEDURE — 99999 PR PBB SHADOW E&M-EST. PATIENT-LVL II: CPT | Mod: PBBFAC,,, | Performed by: DERMATOLOGY

## 2018-08-16 RX ORDER — AZITHROMYCIN 250 MG/1
TABLET, FILM COATED ORAL
Qty: 6 TABLET | Refills: 0 | Status: SHIPPED | OUTPATIENT
Start: 2018-08-16 | End: 2018-08-21

## 2018-08-16 NOTE — PROGRESS NOTES
Subjective:       Patient ID:  Leny Tucker is a 62 y.o. female who presents for   Chief Complaint   Patient presents with    Cyst    Rash     Pt here for initial visit c/o a cyst on her mid back present for 1 week, using hot compress her  drained yellow fluid from it recently. Pt also c/o a rash on her left flank, does not itch,  not treated    Past Medical History:  Allergy to pollen  GERD (gastroesophageal reflux disease)        Review of Systems   Skin: Positive for itching and rash. Negative for daily sunscreen use, activity-related sunscreen use and recent sunburn.        Objective:    Physical Exam   Constitutional: She appears well-developed and well-nourished.   Neurological: She is alert and oriented to person, place, and time.   Psychiatric: She has a normal mood and affect.   Skin:   Areas Examined (abnormalities noted in diagram):   Head / Face Inspection Performed  Neck Inspection Performed  Chest / Axilla Inspection Performed  Abdomen Inspection Performed  Back Inspection Performed  RUE Inspected  LUE Inspection Performed                  Diagram Legend     Erythematous scaling macule/papule c/w actinic keratosis       Vascular papule c/w angioma      Pigmented verrucoid papule/plaque c/w seborrheic keratosis      Yellow umbilicated papule c/w sebaceous hyperplasia      Irregularly shaped tan macule c/w lentigo     1-2 mm smooth white papules consistent with Milia      Movable subcutaneous cyst with punctum c/w epidermal inclusion cyst      Subcutaneous movable cyst c/w pilar cyst      Firm pink to brown papule c/w dermatofibroma      Pedunculated fleshy papule(s) c/w skin tag(s)      Evenly pigmented macule c/w junctional nevus     Mildly variegated pigmented, slightly irregular-bordered macule c/w mildly atypical nevus      Flesh colored to evenly pigmented papule c/w intradermal nevus       Pink pearly papule/plaque c/w basal cell carcinoma      Erythematous hyperkeratotic cursted  plaque c/w SCC      Surgical scar with no sign of skin cancer recurrence      Open and closed comedones      Inflammatory papules and pustules      Verrucoid papule consistent consistent with wart     Erythematous eczematous patches and plaques     Dystrophic onycholytic nail with subungual debris c/w onychomycosis     Umbilicated papule    Erythematous-base heme-crusted tan verrucoid plaque consistent with inflamed seborrheic keratosis     Erythematous Silvery Scaling Plaque c/w Psoriasis     See annotation      Assessment / Plan:        Epidermal inclusion cyst-inflamed  Pt declined doxycycline since it upsets her stomach  She would like to try Z valentin instead  Warm compresses  Small amount of drainage expressed so that was sent for culture today  -     azithromycin (Z-VALENTIN) 250 MG tablet; Take 2 tablets by mouth on day 1; Take 1 tablet by mouth on days 2-5  Dispense: 6 tablet; Refill: 0  -     Aerobic culture    Seborrheic keratoses  These are benign inherited growths without a malignant potential. Reassurance given to patient. No treatment is necessary.            Follow-up for Pt will call for follow up.pt will call in 3 weeks

## 2018-08-16 NOTE — LETTER
August 18, 2018      Wendie Patel, SALLIE  [address] [facility] [address] [place] [address]           [facility]  [address] [facility] [address] [place] [address]  Phone: [phone]  Fax: [phone]          Patient: Leny Tucker   MR Number: [id number]   YOB: 1955   Date of Visit: 8/16/2018       Dear Wendie Patel:    Thank you for referring Leny Tucker to me for evaluation. Attached you will find relevant portions of my assessment and plan of care.    If you have questions, please do not hesitate to call me. I look forward to following Leny Tucker along with you.    Sincerely,    Estephanie Aviles MD    Enclosure  CC:  No Recipients    If you would like to receive this communication electronically, please contact [email or web address] or [phone] to request more information on [facility] access.    For providers and/or their staff who would like to refer a patient to [facility], please contact us through our one-stop-shop provider referral line, [facility], at [phone].    If you feel you have received this communication in error or would no longer like to receive these types of communications, please e-mail [email or web address]

## 2018-08-20 LAB — BACTERIA SPEC AEROBE CULT: NO GROWTH

## 2018-09-10 DIAGNOSIS — M25.561 CHRONIC PAIN OF RIGHT KNEE: ICD-10-CM

## 2018-09-10 DIAGNOSIS — G89.29 CHRONIC PAIN OF RIGHT KNEE: ICD-10-CM

## 2018-09-10 DIAGNOSIS — J30.2 SEASONAL ALLERGIC RHINITIS, UNSPECIFIED TRIGGER: Primary | Chronic | ICD-10-CM

## 2018-09-10 RX ORDER — FLUTICASONE PROPIONATE 50 MCG
SPRAY, SUSPENSION (ML) NASAL
Qty: 16 ML | Refills: 11 | Status: SHIPPED | OUTPATIENT
Start: 2018-09-10 | End: 2019-08-15 | Stop reason: SDUPTHER

## 2018-09-10 RX ORDER — MELOXICAM 7.5 MG/1
TABLET ORAL
Qty: 90 TABLET | Refills: 3 | Status: SHIPPED | OUTPATIENT
Start: 2018-09-10 | End: 2019-07-29 | Stop reason: SDUPTHER

## 2019-01-18 DIAGNOSIS — Z12.39 BREAST CANCER SCREENING: ICD-10-CM

## 2019-05-07 DIAGNOSIS — Z12.11 COLON CANCER SCREENING: ICD-10-CM

## 2019-05-08 ENCOUNTER — LAB VISIT (OUTPATIENT)
Dept: LAB | Facility: HOSPITAL | Age: 64
End: 2019-05-08
Attending: EMERGENCY MEDICINE
Payer: COMMERCIAL

## 2019-05-08 DIAGNOSIS — Z12.11 COLON CANCER SCREENING: ICD-10-CM

## 2019-05-08 PROCEDURE — 82274 ASSAY TEST FOR BLOOD FECAL: CPT

## 2019-05-09 LAB — HEMOCCULT STL QL IA: NEGATIVE

## 2019-07-02 DIAGNOSIS — R03.0 ELEVATED BLOOD PRESSURE READING WITHOUT DIAGNOSIS OF HYPERTENSION: ICD-10-CM

## 2019-07-02 RX ORDER — HYDROCHLOROTHIAZIDE 25 MG/1
TABLET ORAL
Qty: 30 TABLET | Refills: 2 | Status: SHIPPED | OUTPATIENT
Start: 2019-07-02 | End: 2019-07-29 | Stop reason: SDUPTHER

## 2019-07-02 NOTE — TELEPHONE ENCOUNTER
Medications refilled.      Labs due.  Orders in.      I would encourage additional blood pressure readings.    Please schedule.

## 2019-07-16 ENCOUNTER — TELEPHONE (OUTPATIENT)
Dept: FAMILY MEDICINE | Facility: CLINIC | Age: 64
End: 2019-07-16

## 2019-07-29 ENCOUNTER — CLINICAL SUPPORT (OUTPATIENT)
Dept: FAMILY MEDICINE | Facility: CLINIC | Age: 64
End: 2019-07-29
Payer: COMMERCIAL

## 2019-07-29 ENCOUNTER — HOSPITAL ENCOUNTER (OUTPATIENT)
Dept: RADIOLOGY | Facility: HOSPITAL | Age: 64
Discharge: HOME | End: 2019-07-29
Attending: EMERGENCY MEDICINE
Payer: COMMERCIAL

## 2019-07-29 ENCOUNTER — OFFICE VISIT (OUTPATIENT)
Dept: FAMILY MEDICINE | Facility: CLINIC | Age: 64
End: 2019-07-29
Payer: COMMERCIAL

## 2019-07-29 VITALS — WEIGHT: 226 LBS | BODY MASS INDEX: 35.47 KG/M2 | HEIGHT: 67 IN

## 2019-07-29 VITALS
HEIGHT: 67 IN | RESPIRATION RATE: 14 BRPM | TEMPERATURE: 99 F | OXYGEN SATURATION: 96 % | WEIGHT: 231.06 LBS | DIASTOLIC BLOOD PRESSURE: 80 MMHG | HEART RATE: 66 BPM | SYSTOLIC BLOOD PRESSURE: 142 MMHG | BODY MASS INDEX: 36.27 KG/M2

## 2019-07-29 DIAGNOSIS — I10 ESSENTIAL HYPERTENSION: ICD-10-CM

## 2019-07-29 DIAGNOSIS — K21.9 GASTROESOPHAGEAL REFLUX DISEASE, ESOPHAGITIS PRESENCE NOT SPECIFIED: Chronic | ICD-10-CM

## 2019-07-29 DIAGNOSIS — Z12.39 BREAST CANCER SCREENING: ICD-10-CM

## 2019-07-29 DIAGNOSIS — Z00.00 PREVENTATIVE HEALTH CARE: Primary | ICD-10-CM

## 2019-07-29 DIAGNOSIS — M12.9 ARTHROPATHY: ICD-10-CM

## 2019-07-29 DIAGNOSIS — M25.561 CHRONIC PAIN OF RIGHT KNEE: ICD-10-CM

## 2019-07-29 DIAGNOSIS — G89.29 CHRONIC PAIN OF RIGHT KNEE: ICD-10-CM

## 2019-07-29 PROCEDURE — 99999 PR PBB SHADOW E&M-EST. PATIENT-LVL IV: CPT | Mod: PBBFAC,,, | Performed by: PHYSICIAN ASSISTANT

## 2019-07-29 PROCEDURE — 3077F SYST BP >= 140 MM HG: CPT | Mod: CPTII,S$GLB,, | Performed by: PHYSICIAN ASSISTANT

## 2019-07-29 PROCEDURE — 3079F PR MOST RECENT DIASTOLIC BLOOD PRESSURE 80-89 MM HG: ICD-10-PCS | Mod: CPTII,S$GLB,, | Performed by: PHYSICIAN ASSISTANT

## 2019-07-29 PROCEDURE — 99396 PR PREVENTIVE VISIT,EST,40-64: ICD-10-PCS | Mod: S$GLB,,, | Performed by: PHYSICIAN ASSISTANT

## 2019-07-29 PROCEDURE — 3079F DIAST BP 80-89 MM HG: CPT | Mod: CPTII,S$GLB,, | Performed by: PHYSICIAN ASSISTANT

## 2019-07-29 PROCEDURE — 77067 MAMMO DIGITAL SCREENING BILAT WITH TOMOSYNTHESIS_CAD: ICD-10-PCS | Mod: 26,,, | Performed by: RADIOLOGY

## 2019-07-29 PROCEDURE — 99999 PR PBB SHADOW E&M-EST. PATIENT-LVL I: CPT | Mod: PBBFAC,,,

## 2019-07-29 PROCEDURE — 77067 SCR MAMMO BI INCL CAD: CPT | Mod: 26,,, | Performed by: RADIOLOGY

## 2019-07-29 PROCEDURE — 77063 BREAST TOMOSYNTHESIS BI: CPT | Mod: 26,,, | Performed by: RADIOLOGY

## 2019-07-29 PROCEDURE — 3077F PR MOST RECENT SYSTOLIC BLOOD PRESSURE >= 140 MM HG: ICD-10-PCS | Mod: CPTII,S$GLB,, | Performed by: PHYSICIAN ASSISTANT

## 2019-07-29 PROCEDURE — 99999 PR PBB SHADOW E&M-EST. PATIENT-LVL IV: ICD-10-PCS | Mod: PBBFAC,,, | Performed by: PHYSICIAN ASSISTANT

## 2019-07-29 PROCEDURE — 99396 PREV VISIT EST AGE 40-64: CPT | Mod: S$GLB,,, | Performed by: PHYSICIAN ASSISTANT

## 2019-07-29 PROCEDURE — 99999 PR PBB SHADOW E&M-EST. PATIENT-LVL I: ICD-10-PCS | Mod: PBBFAC,,,

## 2019-07-29 PROCEDURE — 77063 MAMMO DIGITAL SCREENING BILAT WITH TOMOSYNTHESIS_CAD: ICD-10-PCS | Mod: 26,,, | Performed by: RADIOLOGY

## 2019-07-29 PROCEDURE — 77067 SCR MAMMO BI INCL CAD: CPT | Mod: TC,PO

## 2019-07-29 RX ORDER — HYDROCHLOROTHIAZIDE 25 MG/1
TABLET ORAL
Qty: 90 TABLET | Refills: 5 | Status: SHIPPED | OUTPATIENT
Start: 2019-07-29 | End: 2020-10-06

## 2019-07-29 RX ORDER — MELOXICAM 7.5 MG/1
TABLET ORAL
Qty: 90 TABLET | Refills: 3 | Status: SHIPPED | OUTPATIENT
Start: 2019-07-29 | End: 2019-10-29 | Stop reason: SDUPTHER

## 2019-07-29 RX ORDER — DICLOFENAC SODIUM 10 MG/G
2 GEL TOPICAL 4 TIMES DAILY
Qty: 100 G | Refills: 2 | Status: SHIPPED | OUTPATIENT
Start: 2019-07-29 | End: 2022-01-14 | Stop reason: SDUPTHER

## 2019-07-29 NOTE — PROGRESS NOTES
"Subjective:      Patient ID: Leny Tucker is a 63 y.o. female.    Chief Complaint: Hypertension  Patient is new to me.    HPI   Patient has been taking blood pressure medication daily.  She just drank a cup of coffee and took blood pressure medication.  Patient brought blood pressure log-average was 133/85.    GERD-controlled with Zantac.    She takes Mobic daily for osteoarthritis for her knees.    Review of Systems   Constitutional: Negative for chills and fever.   HENT: Negative for ear pain, sinus pressure and sinus pain.    Eyes: Positive for redness.   Respiratory: Negative for shortness of breath.    Cardiovascular: Negative for chest pain and leg swelling.   Gastrointestinal: Negative for abdominal pain, blood in stool, constipation, diarrhea, nausea and vomiting.   Genitourinary: Negative for dysuria and hematuria.   Skin: Positive for rash (psoriasis on elbows).   Allergic/Immunologic: Positive for environmental allergies.   Neurological: Negative for dizziness, light-headedness and headaches.       Objective:   BP (!) 142/80   Pulse 66   Temp 99.1 °F (37.3 °C)   Resp 14   Ht 5' 7" (1.702 m)   Wt 104.8 kg (231 lb 0.7 oz)   SpO2 96%   BMI 36.19 kg/m²      Physical Exam   Constitutional: She is oriented to person, place, and time. She appears well-developed and well-nourished. She is active and cooperative. No distress.   HENT:   Head: Normocephalic and atraumatic.   Right Ear: Hearing, tympanic membrane, external ear and ear canal normal.   Left Ear: Hearing, tympanic membrane, external ear and ear canal normal.   Nose: Nose normal. Right sinus exhibits no maxillary sinus tenderness and no frontal sinus tenderness. Left sinus exhibits no maxillary sinus tenderness and no frontal sinus tenderness.   Mouth/Throat: Uvula is midline, oropharynx is clear and moist and mucous membranes are normal. No oropharyngeal exudate. No tonsillar exudate.   Eyes: Lids are normal. Right conjunctiva is injected. Left " conjunctiva is injected.   Neck: Normal range of motion and phonation normal.   Cardiovascular: Normal rate, regular rhythm and normal heart sounds. Exam reveals no gallop and no friction rub.   No murmur heard.  Pulmonary/Chest: Effort normal and breath sounds normal. No stridor. No respiratory distress. She has no decreased breath sounds. She has no wheezes. She has no rhonchi. She has no rales.   Abdominal: Soft. Bowel sounds are normal. There is no tenderness.   Musculoskeletal: Normal range of motion.   Lymphadenopathy:        Head (right side): No submental, no submandibular, no tonsillar, no preauricular, no posterior auricular and no occipital adenopathy present.        Head (left side): No submental, no submandibular, no tonsillar, no preauricular, no posterior auricular and no occipital adenopathy present.     She has no cervical adenopathy.   Neurological: She is alert and oriented to person, place, and time.   Skin: Skin is warm, dry and intact. No rash noted.   Psychiatric: She has a normal mood and affect. Her speech is normal and behavior is normal. Judgment and thought content normal. Cognition and memory are normal.   Vitals reviewed.     Assessment:      1. Preventative health care    2. Essential hypertension    3. Arthropathy    4. Chronic pain of right knee    5. Gastroesophageal reflux disease, esophagitis presence not specified       Plan:   1. Preventative health care    2. Essential hypertension  Continue to monitor blood pressure once daily and bring in log to next appointment.  Minimize salt in your diet.  - hydroCHLOROthiazide (HYDRODIURIL) 25 MG tablet; TAKE 1 TABLET(25 MG) BY MOUTH EVERY DAY  Dispense: 90 tablet; Refill: 5    3. Arthropathy  - diclofenac sodium (VOLTAREN) 1 % Gel; Apply 2 g topically 4 (four) times daily.  Dispense: 100 g; Refill: 2    4. Chronic pain of right knee  - meloxicam (MOBIC) 7.5 MG tablet; TAKE 1 TABLET BY MOUTH DAILY WITH FOOD AS NEEDED FOR PAIN  Dispense:  90 tablet; Refill: 3    5. Gastroesophageal reflux disease, esophagitis presence not specified  Controlled with Zantac as needed.    Follow up in three months with Dr. Sullivan.  Patient agreed with plan and expressed understanding.

## 2019-07-29 NOTE — PATIENT INSTRUCTIONS
Continue to monitor blood pressure once daily and bring in log to next appointment.  Minimize salt in your diet.    Thanks for seeing me,  Criss Carreon PA-C

## 2019-07-29 NOTE — PROGRESS NOTES
Leny Tucker 63 y.o. female is here today for Blood Pressure check.   History of HTN yes.    Review of patient's allergies indicates:   Allergen Reactions    Doxycycline     Augmentin [amoxicillin-pot clavulanate] Rash    Sulfa (sulfonamide antibiotics) Rash     Creatinine   Date Value Ref Range Status   07/05/2018 1.0 0.5 - 1.4 mg/dL Final     Sodium   Date Value Ref Range Status   07/05/2018 143 136 - 145 mmol/L Final     Potassium   Date Value Ref Range Status   07/05/2018 3.8 3.5 - 5.1 mmol/L Final   ]  Patient verifies taking blood pressure medications on a regular basis at the same time of the day.     Current Outpatient Medications:     cetirizine (ZYRTEC) 10 MG tablet, Take 10 mg by mouth once daily., Disp: , Rfl:     fluticasone (FLONASE) 50 mcg/actuation nasal spray, SPRAY TWICE INTO EACH NOSTRIL TWICE DAILY, Disp: 16 mL, Rfl: 11    hydroCHLOROthiazide (HYDRODIURIL) 25 MG tablet, TAKE 1 TABLET(25 MG) BY MOUTH EVERY DAY, Disp: 30 tablet, Rfl: 2    meloxicam (MOBIC) 7.5 MG tablet, TAKE 1 TABLET BY MOUTH DAILY WITH FOOD AS NEEDED FOR PAIN, Disp: 90 tablet, Rfl: 3    multivitamin capsule, Take 1 capsule by mouth once daily., Disp: , Rfl:     triamcinolone acetonide 0.1% (KENALOG) 0.1 % Lotn, Apply topically 2 (two) times daily., Disp: 60 mL, Rfl: 3    albuterol 90 mcg/actuation inhaler, Inhale 2 puffs into the lungs every 6 (six) hours as needed for Wheezing. Rescue, Disp: 18 g, Rfl: 0    azelastine (ASTELIN) 137 mcg (0.1 %) nasal spray, 1 spray (137 mcg total) by Nasal route 2 (two) times daily., Disp: 30 mL, Rfl: 0    ketoconazole (NIZORAL) 2 % shampoo, Apply topically once daily., Disp: 120 mL, Rfl: 6    ranitidine (ZANTAC) 150 MG tablet, TAKE 1 TABLET BY MOUTH TWICE A DAY, Disp: 660 tablet, Rfl: 0  Does patient have record of home blood pressure readings yes. Readings have been averaging .   Last dose of blood pressure medication was taken at this am.  Patient is asymptomatic.   Complains of  nothing.      Blood Pressure 130/88 very hard to hear. She will see Es mack today for eval and refill of medicaiton.       Blood pressure reading after 15 minutes was 130/88, Pulse N/A.  Dr. Sullivan  notified.

## 2019-08-06 PROBLEM — I10 ESSENTIAL HYPERTENSION: Chronic | Status: ACTIVE | Noted: 2017-06-01

## 2019-08-15 DIAGNOSIS — J30.2 SEASONAL ALLERGIC RHINITIS, UNSPECIFIED TRIGGER: Chronic | ICD-10-CM

## 2019-08-15 RX ORDER — FLUTICASONE PROPIONATE 50 MCG
SPRAY, SUSPENSION (ML) NASAL
Qty: 16 ML | Refills: 11 | Status: SHIPPED | OUTPATIENT
Start: 2019-08-15 | End: 2020-07-21

## 2019-10-29 ENCOUNTER — PATIENT MESSAGE (OUTPATIENT)
Dept: ADMINISTRATIVE | Facility: OTHER | Age: 64
End: 2019-10-29

## 2019-10-29 ENCOUNTER — OFFICE VISIT (OUTPATIENT)
Dept: FAMILY MEDICINE | Facility: CLINIC | Age: 64
End: 2019-10-29
Payer: COMMERCIAL

## 2019-10-29 ENCOUNTER — PATIENT OUTREACH (OUTPATIENT)
Dept: OTHER | Facility: OTHER | Age: 64
End: 2019-10-29

## 2019-10-29 VITALS
RESPIRATION RATE: 17 BRPM | SYSTOLIC BLOOD PRESSURE: 142 MMHG | DIASTOLIC BLOOD PRESSURE: 98 MMHG | BODY MASS INDEX: 36.29 KG/M2 | HEART RATE: 80 BPM | HEIGHT: 67 IN | WEIGHT: 231.25 LBS | OXYGEN SATURATION: 94 %

## 2019-10-29 DIAGNOSIS — I10 ESSENTIAL HYPERTENSION: Chronic | ICD-10-CM

## 2019-10-29 DIAGNOSIS — G89.29 CHRONIC PAIN OF RIGHT KNEE: ICD-10-CM

## 2019-10-29 DIAGNOSIS — M25.561 CHRONIC PAIN OF RIGHT KNEE: ICD-10-CM

## 2019-10-29 DIAGNOSIS — K21.9 GASTROESOPHAGEAL REFLUX DISEASE, ESOPHAGITIS PRESENCE NOT SPECIFIED: Primary | Chronic | ICD-10-CM

## 2019-10-29 PROCEDURE — 99213 PR OFFICE/OUTPT VISIT, EST, LEVL III, 20-29 MIN: ICD-10-PCS | Mod: S$GLB,,, | Performed by: EMERGENCY MEDICINE

## 2019-10-29 PROCEDURE — 3077F PR MOST RECENT SYSTOLIC BLOOD PRESSURE >= 140 MM HG: ICD-10-PCS | Mod: CPTII,S$GLB,, | Performed by: EMERGENCY MEDICINE

## 2019-10-29 PROCEDURE — 99213 OFFICE O/P EST LOW 20 MIN: CPT | Mod: S$GLB,,, | Performed by: EMERGENCY MEDICINE

## 2019-10-29 PROCEDURE — 99999 PR PBB SHADOW E&M-EST. PATIENT-LVL IV: CPT | Mod: PBBFAC,,, | Performed by: EMERGENCY MEDICINE

## 2019-10-29 PROCEDURE — 3080F DIAST BP >= 90 MM HG: CPT | Mod: CPTII,S$GLB,, | Performed by: EMERGENCY MEDICINE

## 2019-10-29 PROCEDURE — 3008F BODY MASS INDEX DOCD: CPT | Mod: CPTII,S$GLB,, | Performed by: EMERGENCY MEDICINE

## 2019-10-29 PROCEDURE — 3077F SYST BP >= 140 MM HG: CPT | Mod: CPTII,S$GLB,, | Performed by: EMERGENCY MEDICINE

## 2019-10-29 PROCEDURE — 3008F PR BODY MASS INDEX (BMI) DOCUMENTED: ICD-10-PCS | Mod: CPTII,S$GLB,, | Performed by: EMERGENCY MEDICINE

## 2019-10-29 PROCEDURE — 3080F PR MOST RECENT DIASTOLIC BLOOD PRESSURE >= 90 MM HG: ICD-10-PCS | Mod: CPTII,S$GLB,, | Performed by: EMERGENCY MEDICINE

## 2019-10-29 PROCEDURE — 99999 PR PBB SHADOW E&M-EST. PATIENT-LVL IV: ICD-10-PCS | Mod: PBBFAC,,, | Performed by: EMERGENCY MEDICINE

## 2019-10-29 RX ORDER — MELOXICAM 7.5 MG/1
TABLET ORAL
Qty: 90 TABLET | Refills: 3 | Status: SHIPPED | OUTPATIENT
Start: 2019-10-29 | End: 2020-11-10 | Stop reason: SDUPTHER

## 2019-10-29 NOTE — PATIENT INSTRUCTIONS
Leny,    A trial of Tagmet (cimetadine) is reasonable.    I do recommend enrolling is DIGITAL HYPERTENSION.    If you have problems with the program let me know.    Kulwant Sullivan MD

## 2019-10-29 NOTE — LETTER
November 22, 2019     Leny Tucker  [address] [place] [id number]       Dear Leny,    Welcome to [facility]! Our goal is to make care effective, proactive and convenient by using data you send us from home to better treat your chronic conditions.              My name is Aureliano Heart, and I am your dedicated Digital Medicine clinician. As an expert in medication management, I will help ensure that the medications you are taking continue to provide the intended benefits and help you reach your goals. You can reach me directly at [phone] or by sending me a message directly through your [facility] account.      I am Kae Bills and I will be your health . My job is to help you identify lifestyle changes to improve your disease control. We will talk about nutrition, exercise, and other ways you may be able to adjust your current habits to better your health. Additionally, we will help ensure you are completing the tests and screenings that are necessary to help manage your conditions. You can reach me directly at [phone] or by sending me a message directly through your [facility] account.    Most importantly, YOU are at the center of this team. Together, we will work to improve your overall health and encourage you to meet your goals for a healthier lifestyle.     What we expect from YOU:  · Please take frequent home blood pressure measurements. We ask that you take at least 1 blood pressure reading per week, but more information will better help us get you know you. Be sure you rest for a few minutes before taking the reading in a quiet, comfortable place.     Be available to receive phone calls or [facility] messages, when appropriate, from your care team. Please let us know if there are any specific days or times that work best for us to reach you via phone.     Complete routine tests and screenings. Dont worry, we will help keep you on track!           What you  should expect from your Digital Medicine Care Team:   We will work with you to create a personalized plan of care and provide you with encouragement and education, including regarding lifestyle changes, that could help you manage your disease states.     We will adjust your current medications, if needed, and continue to monitor your long-term progress.     We will provide you and your physician with monthly progress reports after you have been in the program for more than 30 days.     We will send you reminders through [facility] and text messages to help ensure you do not miss any testing deadlines to help manage your disease states.    You will be able to reach us by phone or through your [facility] account by clicking our names under Care Team on the right side of the home screen.    I look forward to working with you to achieve your blood pressure goals!    We look forward to working with you to help manage your health,    Sincerely,    Your Digital Medicine Team    Please visit our websites to learn more:   · Hypertension: [email or web address]      Remember, we are not available for emergencies. If you have an emergency, please contact your doctors office directly or call [facility] on-call ([phone] or [phone]) or [phone].

## 2019-10-29 NOTE — PROGRESS NOTES
HYPERTENSION  Our goal is to get BP to consistently below 130/80mmHg and make the process convenient so patient can avoid extra trips to the office. Getting your blood pressure below 130/80mmHg (definition of control) will reduce your risk for heart attack, kidney failure, stroke and death (as well as kidney failure, eye disease, & dementia)      Reviewed that the Digital Medicine care team - consisting of a clinician and a health  - will follow the most current evidence-based national guidelines for treating your condition.  The health  will focus on lifestyle modifications and motivation while the clinician will focus on medication therapy.  The care team will review all data on a regular basis and reach out as needed.      Explained that one of the key parts of the program is communication with the care team.  Asked patient to respond to outreach attempts and complete questionnaires.  Stressed importance of medication adherence.    Explained that we expect patient to obtain several blood pressures per week at random times of day.  Instructed patient not to allow anyone else to use phone and monitoring device.  Confirmed appropriate BP monitoring technique.      Explained to patient that the digital medicine team is not available for emergencies.  Patient will call [facility] on-[facility] ([phone] or [phone]) or [phone] if needed.      Patient's BP goal is 130/80.Patient's BP average is 141/86 mmHg, which is above goal, per 2017 ACC/AHA Hypertension Guidelines.

## 2019-10-29 NOTE — LETTER
November 22, 2019     Leny Tucker  [address] [place] [id number]       Dear Leny,    Welcome to [facility]! Our goal is to make care effective, proactive and convenient by using data you send us from home to better treat your chronic conditions.              My name is Aureliano Heart, and I am your dedicated Digital Medicine clinician. As an expert in medication management, I will help ensure that the medications you are taking continue to provide the intended benefits and help you reach your goals. You can reach me directly at [phone] or by sending me a message directly through your [facility] account.      I am Kae Bills and I will be your health . My job is to help you identify lifestyle changes to improve your disease control. We will talk about nutrition, exercise, and other ways you may be able to adjust your current habits to better your health. Additionally, we will help ensure you are completing the tests and screenings that are necessary to help manage your conditions. You can reach me directly at [phone] or by sending me a message directly through your [facility] account.    Most importantly, YOU are at the center of this team. Together, we will work to improve your overall health and encourage you to meet your goals for a healthier lifestyle.     What we expect from YOU:  · Please take frequent home blood pressure measurements. We ask that you take at least 1 blood pressure reading per week, but more information will better help us get you know you. Be sure you rest for a few minutes before taking the reading in a quiet, comfortable place.     Be available to receive phone calls or [facility] messages, when appropriate, from your care team. Please let us know if there are any specific days or times that work best for us to reach you via phone.     Complete routine tests and screenings. Dont worry, we will help keep you on track!           What you  should expect from your Digital Medicine Care Team:   We will work with you to create a personalized plan of care and provide you with encouragement and education, including regarding lifestyle changes, that could help you manage your disease states.     We will adjust your current medications, if needed, and continue to monitor your long-term progress.     We will provide you and your physician with monthly progress reports after you have been in the program for more than 30 days.     We will send you reminders through [facility] and text messages to help ensure you do not miss any testing deadlines to help manage your disease states.    You will be able to reach us by phone or through your [facility] account by clicking our names under Care Team on the right side of the home screen.    I look forward to working with you to achieve your blood pressure goals!    We look forward to working with you to help manage your health,    Sincerely,    Your Digital Medicine Team    Please visit our websites to learn more:   · Hypertension: [email or web address]      Remember, we are not available for emergencies. If you have an emergency, please contact your doctors office directly or call [facility] on-call ([phone] or [phone]) or [phone].

## 2019-10-29 NOTE — PROGRESS NOTES
Subjective:   THIS NOTE IS DONE WITH VOICE RECOGNITION        Patient ID: Leny Tucker is a 64 y.o. female.    Chief Complaint: Hypertension      HPI     She presents today with concerns around the recall of ranitidine.  She uses small amounts of this to help control her gastroesophageal reflux disease.  She is not experiencing any melena or hematochezia.  Her GERD is persistent.  It is worse when she certain foods, like pizza.  She is not modified her diet.    She is using nonsteroidal anti-inflammatories, specifically meloxicam for her knees.  This too may be exacerbating her gastroesophageal reflux.    Here to follow up on blood pressure.  Taking current medications.    BP Readings from Last 3 Encounters:   10/29/19 (!) 142/98   07/29/19 (!) 142/80   08/09/18 (!) 132/90     Efforts to calculate her cardiovascular risk were not successful, as she is due for a follow-up cholesterol level.    Immunization History   Administered Date(s) Administered    Hepatitis B, Adult 12/16/2016    Influenza - Quadrivalent 09/25/2019    Tdap 06/14/2017     She does work at [email or web address], and did have an influenza vaccine this year.    Current Outpatient Medications   Medication Sig Dispense Refill    cetirizine (ZYRTEC) 10 MG tablet Take 10 mg by mouth once daily.      diclofenac sodium (VOLTAREN) 1 % Gel Apply 2 g topically 4 (four) times daily. 100 g 2    fluticasone propionate (FLONASE) 50 mcg/actuation nasal spray SPRAY TWICE INTO EACH NOSTRIL TWICE DAILY 16 mL 11    hydroCHLOROthiazide (HYDRODIURIL) 25 MG tablet TAKE 1 TABLET(25 MG) BY MOUTH EVERY DAY 90 tablet 5    ketoconazole (NIZORAL) 2 % shampoo Apply topically once daily. 120 mL 6    meloxicam (MOBIC) 7.5 MG tablet TAKE 1 TABLET BY MOUTH DAILY WITH FOOD AS NEEDED FOR PAIN 90 tablet 3    multivitamin capsule Take 1 capsule by mouth once daily.      albuterol 90 mcg/actuation inhaler Inhale 2 puffs into the lungs every 6 (six) hours as needed for Wheezing. Rescue  (Patient not taking: Reported on 10/29/2019) 18 g 0    azelastine (ASTELIN) 137 mcg (0.1 %) nasal spray 1 spray (137 mcg total) by Nasal route 2 (two) times daily. 30 mL 0    ranitidine (ZANTAC) 150 MG tablet TAKE 1 TABLET BY MOUTH TWICE A DAY (Patient not taking: Reported on 10/29/2019) 660 tablet 0     No current facility-administered medications for this visit.          Review of Systems   Constitutional: Negative for activity change, appetite change, chills, diaphoresis, fatigue, fever and unexpected weight change.   HENT: Negative for trouble swallowing and voice change.    Eyes: Negative for pain and visual disturbance.   Respiratory: Negative for cough, chest tightness and shortness of breath.    Cardiovascular: Negative for chest pain, palpitations and leg swelling.   Gastrointestinal: Negative for abdominal distention, abdominal pain and blood in stool.        Positive reflux   Genitourinary: Negative for difficulty urinating, flank pain, frequency and urgency.   Musculoskeletal: Positive for arthralgias (knees). Negative for back pain, joint swelling, myalgias, neck pain and neck stiffness.   Skin: Negative for pallor and rash.   Neurological: Negative for dizziness, tremors, syncope, weakness and headaches.   Hematological: Negative for adenopathy.   Psychiatric/Behavioral: Negative for dysphoric mood and sleep disturbance. The patient is not nervous/anxious.        Objective:      Physical Exam   Constitutional: She is oriented to person, place, and time. She appears well-developed. No distress.   HENT:   Head: Normocephalic.   Mouth/Throat: Oropharynx is clear and moist.   Eyes: Pupils are equal, round, and reactive to light. Conjunctivae are normal.   Neck: No JVD present.   Cardiovascular: Normal rate, regular rhythm and normal heart sounds.   Pulmonary/Chest: Breath sounds normal. She has no wheezes. She has no rales.   Abdominal: Bowel sounds are normal. There is no tenderness. There is no  guarding.   Musculoskeletal: She exhibits no edema.        Right knee: Tenderness found. Medial joint line and lateral joint line tenderness noted.        Left knee: Tenderness found. Medial joint line and lateral joint line tenderness noted.   Lymphadenopathy:     She has no cervical adenopathy.   Neurological: She is alert and oriented to person, place, and time.   Skin: Skin is warm and dry. Capillary refill takes less than 2 seconds. No rash noted.   Psychiatric: She has a normal mood and affect. Judgment and thought content normal.   Vitals reviewed.      Assessment:       1. Gastroesophageal reflux disease, esophagitis presence not specified    2. Essential hypertension    3. Chronic pain of right knee        Plan:       1. Gastroesophageal reflux disease, esophagitis presence not specified  Stable  Discussed using alternative medications to ranitidine such as over-the-counter proton pump inhibitor or cimetidine  As remitting becomes more available should go back to this dosing.    2. Essential hypertension  Controlled.  Continue current medications  Continue to avoid excessive sodium  Continue home monitoring  Target blood pressure is 139/89 or less  - Lipid panel; Future    3. Chronic pain of right knee  Stable  Moderate negative influence as far as activity  Continue as needed Mobic.  Precautions about over utilization.  Her knee pain is not a point that she would consider additional interventions.  Weight loss encouraged  - meloxicam (MOBIC) 7.5 MG tablet; TAKE 1 TABLET BY MOUTH DAILY WITH FOOD AS NEEDED FOR PAIN  Dispense: 90 tablet; Refill: 3

## 2019-10-30 ENCOUNTER — PATIENT MESSAGE (OUTPATIENT)
Dept: ADMINISTRATIVE | Facility: OTHER | Age: 64
End: 2019-10-30

## 2019-11-04 ENCOUNTER — LAB VISIT (OUTPATIENT)
Dept: LAB | Facility: HOSPITAL | Age: 64
End: 2019-11-04
Attending: EMERGENCY MEDICINE
Payer: COMMERCIAL

## 2019-11-04 DIAGNOSIS — I10 ESSENTIAL HYPERTENSION: Chronic | ICD-10-CM

## 2019-11-04 LAB
CHOLEST SERPL-MCNC: 176 MG/DL (ref 120–199)
CHOLEST/HDLC SERPL: 2.8 {RATIO} (ref 2–5)
HDLC SERPL-MCNC: 64 MG/DL (ref 40–75)
HDLC SERPL: 36.4 % (ref 20–50)
LDLC SERPL CALC-MCNC: 100.4 MG/DL (ref 63–159)
NONHDLC SERPL-MCNC: 112 MG/DL
TRIGL SERPL-MCNC: 58 MG/DL (ref 30–150)

## 2019-11-04 PROCEDURE — 80061 LIPID PANEL: CPT

## 2019-11-04 PROCEDURE — 36415 COLL VENOUS BLD VENIPUNCTURE: CPT | Mod: PN

## 2019-11-06 ENCOUNTER — PATIENT OUTREACH (OUTPATIENT)
Dept: OTHER | Facility: OTHER | Age: 64
End: 2019-11-06

## 2019-11-13 NOTE — PROGRESS NOTES
Digital Medicine: Health  Introduction    Introduced Leny Tucker to Digital Medicine. Discussed health  role and recommended lifestyle modifications.    The history is provided by the patient. No  was used.     HYPERTENSION  Our goal is to get BP to consistently below 130/80mmHg and make the process convenient so patient can avoid extra trips to the office. Getting your blood pressure below 130/80mmHg (definition of control) will reduce your risk for heart attack, kidney failure, stroke and death (as well as kidney failure, eye disease, & dementia)      Reviewed non-pharmacologic therapies and impact on BP.      Explained that we expect patient to obtain several blood pressures per week at random times of day.  Instructed patient not to allow anyone else to use phone and monitoring device.  Confirmed appropriate BP monitoring technique.      Patient's BP goal is 130/80.Patient's BP average is 133/87 mmHg, which is above goal, per 2017 ACC/AHA Hypertension Guidelines.          Last 5 Patient Entered Readings                                      Current 30 Day Average: 133/87     Recent Readings 11/12/2019 11/11/2019 11/10/2019 11/9/2019 11/8/2019    SBP (mmHg) 138 135 123 137 130    DBP (mmHg) 88 85 89 88 78    Pulse 89 79 85 77 78            INTERVENTION(S)  recommended diet modifications, recommend physical activity and encouragement/support    PLAN  patient verbalizes understanding, patient amenable to changes and continue monitoring      There are no preventive care reminders to display for this patient.    Reviewed the importance of self-monitoring, medication adherence, and that the health  can be used as a resource for lifestyle modifications to help reduce or maintain a healthy lifestyle.    Sent link to [facility] webpages and my contact information via [employer] for future questions. Follow up scheduled.             Diet Screening   Breakfast is typically  between. Cereal and milk and 1 cup coffee.  Lunch is typically between. [place], Lean cuisine with a diet coke or sweet tea.    Dinner is typically between. Meat, veggies, startch.        The patient drinks 16 ounces of water per day.    She has the following dietary restrictions: low sodium diet    The patient states that she typically eats a non-home cooked meal (ex: dining out, take out, frozen meals) 1-2 times per week.      Intervention(s): reducing sodium intake, reading food labels and increasing water intake    Assigning the following patient goals: maintain low sodium diet    Physical Activity Screening   When asked if exercising, patient responded: yes    Patient participates in the following activities: walking    Mrs. Tucker is active at work, and she walks around 10,000+ steps a day.     Medication Adherence Screening   She misses doses: never      No problems at this time.     Tobacco and Alcohol Screening       No tobacco.     No alcohol.       SDOH

## 2019-11-22 ENCOUNTER — OFFICE VISIT (OUTPATIENT)
Dept: FAMILY MEDICINE | Facility: CLINIC | Age: 64
End: 2019-11-22
Payer: COMMERCIAL

## 2019-11-22 VITALS
DIASTOLIC BLOOD PRESSURE: 78 MMHG | SYSTOLIC BLOOD PRESSURE: 120 MMHG | OXYGEN SATURATION: 96 % | BODY MASS INDEX: 36.02 KG/M2 | WEIGHT: 229.5 LBS | HEIGHT: 67 IN | TEMPERATURE: 99 F | HEART RATE: 81 BPM

## 2019-11-22 DIAGNOSIS — J06.9 UPPER RESPIRATORY TRACT INFECTION, UNSPECIFIED TYPE: Primary | ICD-10-CM

## 2019-11-22 DIAGNOSIS — J02.9 SORE THROAT: ICD-10-CM

## 2019-11-22 DIAGNOSIS — I10 ESSENTIAL HYPERTENSION: Chronic | ICD-10-CM

## 2019-11-22 DIAGNOSIS — R05.9 COUGH: ICD-10-CM

## 2019-11-22 DIAGNOSIS — R52 BODY ACHES: ICD-10-CM

## 2019-11-22 LAB
CTP QC/QA: YES
INFLUENZA A, MOLECULAR: NEGATIVE
INFLUENZA B, MOLECULAR: NEGATIVE
S PYO RRNA THROAT QL PROBE: NEGATIVE
SPECIMEN SOURCE: NORMAL

## 2019-11-22 PROCEDURE — 99999 PR PBB SHADOW E&M-EST. PATIENT-LVL III: CPT | Mod: PBBFAC,,, | Performed by: PHYSICIAN ASSISTANT

## 2019-11-22 PROCEDURE — 87502 INFLUENZA DNA AMP PROBE: CPT | Mod: PO

## 2019-11-22 PROCEDURE — 99999 PR PBB SHADOW E&M-EST. PATIENT-LVL III: ICD-10-PCS | Mod: PBBFAC,,, | Performed by: PHYSICIAN ASSISTANT

## 2019-11-22 PROCEDURE — 87081 CULTURE SCREEN ONLY: CPT

## 2019-11-22 PROCEDURE — 3008F PR BODY MASS INDEX (BMI) DOCUMENTED: ICD-10-PCS | Mod: CPTII,S$GLB,, | Performed by: PHYSICIAN ASSISTANT

## 2019-11-22 PROCEDURE — 99214 OFFICE O/P EST MOD 30 MIN: CPT | Mod: 25,S$GLB,, | Performed by: PHYSICIAN ASSISTANT

## 2019-11-22 PROCEDURE — 99214 PR OFFICE/OUTPT VISIT, EST, LEVL IV, 30-39 MIN: ICD-10-PCS | Mod: 25,S$GLB,, | Performed by: PHYSICIAN ASSISTANT

## 2019-11-22 PROCEDURE — 3074F PR MOST RECENT SYSTOLIC BLOOD PRESSURE < 130 MM HG: ICD-10-PCS | Mod: CPTII,S$GLB,, | Performed by: PHYSICIAN ASSISTANT

## 2019-11-22 PROCEDURE — 3078F PR MOST RECENT DIASTOLIC BLOOD PRESSURE < 80 MM HG: ICD-10-PCS | Mod: CPTII,S$GLB,, | Performed by: PHYSICIAN ASSISTANT

## 2019-11-22 PROCEDURE — 3074F SYST BP LT 130 MM HG: CPT | Mod: CPTII,S$GLB,, | Performed by: PHYSICIAN ASSISTANT

## 2019-11-22 PROCEDURE — 3078F DIAST BP <80 MM HG: CPT | Mod: CPTII,S$GLB,, | Performed by: PHYSICIAN ASSISTANT

## 2019-11-22 PROCEDURE — 87880 STREP A ASSAY W/OPTIC: CPT | Mod: QW,S$GLB,, | Performed by: PHYSICIAN ASSISTANT

## 2019-11-22 PROCEDURE — 3008F BODY MASS INDEX DOCD: CPT | Mod: CPTII,S$GLB,, | Performed by: PHYSICIAN ASSISTANT

## 2019-11-22 PROCEDURE — 87880 POCT RAPID STREP A: ICD-10-PCS | Mod: QW,S$GLB,, | Performed by: PHYSICIAN ASSISTANT

## 2019-11-22 RX ORDER — PROMETHAZINE HYDROCHLORIDE AND DEXTROMETHORPHAN HYDROBROMIDE 6.25; 15 MG/5ML; MG/5ML
5 SYRUP ORAL NIGHTLY PRN
Qty: 120 ML | Refills: 0 | Status: SHIPPED | OUTPATIENT
Start: 2019-11-22 | End: 2021-11-03

## 2019-11-22 NOTE — PATIENT INSTRUCTIONS
Viral Upper Respiratory Illness (Adult)  You have a viral upper respiratory illness (URI), which is another term for the common cold. This illness is contagious during the first few days. It is spread through the air by coughing and sneezing. It may also be spread by direct contact (touching the sick person and then touching your own eyes, nose, or mouth). Frequent handwashing will decrease risk of spread. Most viral illnesses go away within 7 to 10 days with rest and simple home remedies. Sometimes the illness may last for several weeks. Antibiotics will not kill a virus, and they are generally not prescribed for this condition.    Home care  · If symptoms are severe, rest at home for the first 2 to 3 days. When you resume activity, don't let yourself get too tired.  · Avoid being exposed to cigarette smoke (yours or others).  · You may use acetaminophen or ibuprofen to control pain and fever, unless another medicine was prescribed. (Note: If you have chronic liver or kidney disease, have ever had a stomach ulcer or gastrointestinal bleeding, or are taking blood-thinning medicines, talk with your healthcare provider before using these medicines.) Aspirin should never be given to anyone under 18 years of age who is ill with a viral infection or fever. It may cause severe liver or brain damage.  · Your appetite may be poor, so a light diet is fine. Avoid dehydration by drinking 6 to 8 glasses of fluids per day (water, soft drinks, juices, tea, or soup). Extra fluids will help loosen secretions in the nose and lungs.  · Over-the-counter cold medicines will not shorten the length of time youre sick, but they may be helpful for the following symptoms: cough, sore throat, and nasal and sinus congestion. (Note: Do not use decongestants if you have high blood pressure.)  Follow-up care  Follow up with your healthcare provider, or as advised.  When to seek medical advice  Call your healthcare provider right away if any  of these occur:  · Cough with lots of colored sputum (mucus)  · Severe headache; face, neck, or ear pain  · Difficulty swallowing due to throat pain  · Fever of 100.4°F (38°C)  Call 911, or get immediate medical care  Call emergency services right away if any of these occur:  · Chest pain, shortness of breath, wheezing, or difficulty breathing  · Coughing up blood  · Inability to swallow due to throat pain  Date Last Reviewed: 9/13/2015  © 4565-9512 [employer]. [address] [place] [address], [place] [address]. All rights reserved. This information is not intended as a substitute for professional medical care. Always follow your healthcare professional's instructions.    Rest.  Use warm compresses on sinuses for relief several times a day.  Increase fluid intake to at least 64 ounces of water per day to keep secretions thin and loose.  Normal saline nasal wash or Flonase to irrigate sinuses and for congestion/runny nose.  Use a cool mist vaporizer or humidifier in home to help relieve congestion.    Practice good handwashing.  Zyrtec-D or Claritin-D to help dry mucus and post nasal drip.  Mucinex or Mucinex DM for cough and chest congestion.  Avoid decongestants (Sudafed,Mucinex-D,Claritin-D, etc) if you have hypertension.  Tylenol or Ibuprofen for fever, headache and body aches.  Warm salt water gargles and lozenges for throat comfort.  Chloraseptic spray or lozenges for throat comfort.  See PCP if symptoms fail to improve.   Go to ER if you develop fever of 103 or higher, chest pain, shortness of breath, upper back pain, stiff neck or severe headache.

## 2019-11-22 NOTE — PROGRESS NOTES
"Subjective:      Patient ID: Leyn Tucker is a 64 y.o. female.    Chief Complaint: Cough (dry cough, nasal drip, sinus pressure, sore throat, low fever, and body aches for about 2 days )    Patient is new to me, here today for cough/congestion    Cough   This is a new problem. The current episode started in the past 7 days (2days). Associated symptoms include a fever (low grade,100), rhinorrhea and a sore throat. Pertinent negatives include no chills, ear pain, headaches, shortness of breath or wheezing. Treatments tried: OJ, tylenol, Julian's cough drops. There is no history of asthma, COPD or pneumonia.     Review of Systems   Constitutional: Positive for fever (low grade,100). Negative for chills and diaphoresis.   HENT: Positive for rhinorrhea and sore throat. Negative for congestion and ear pain.    Respiratory: Positive for cough (dry). Negative for shortness of breath and wheezing.    Gastrointestinal: Negative for abdominal pain, constipation, diarrhea, nausea and vomiting.        Softer than normal stool   Genitourinary: Negative for dysuria, frequency, hematuria and urgency.   Musculoskeletal: Positive for back pain (low back).        +body aches   Neurological: Negative for dizziness, light-headedness and headaches.       Objective:   /78 (BP Location: Left arm, Patient Position: Sitting)   Pulse 81   Temp 98.9 °F (37.2 °C) (Oral)   Ht 5' 7" (1.702 m)   Wt 104.1 kg (229 lb 8 oz)   SpO2 96%   BMI 35.94 kg/m²   Physical Exam   Constitutional: She appears well-developed and well-nourished. She does not appear ill. No distress.   HENT:   Head: Normocephalic and atraumatic.   Right Ear: Tympanic membrane and ear canal normal. Tympanic membrane is not erythematous. No middle ear effusion.   Left Ear: Tympanic membrane and ear canal normal. Tympanic membrane is not erythematous.  No middle ear effusion.   Nose: Mucosal edema present. Right sinus exhibits frontal sinus tenderness (mild). Right sinus " exhibits no maxillary sinus tenderness. Left sinus exhibits frontal sinus tenderness (mild). Left sinus exhibits no maxillary sinus tenderness.   Mouth/Throat: Uvula is midline and oropharynx is clear and moist. No posterior oropharyngeal erythema.   Signs of PND   Cardiovascular: Normal rate, regular rhythm and normal heart sounds.   No murmur heard.  Pulmonary/Chest: Effort normal and breath sounds normal. No respiratory distress. She has no decreased breath sounds. She has no wheezes. She has no rhonchi. She has no rales.   Lymphadenopathy:     She has no cervical adenopathy.   Skin: Skin is warm and dry. No rash noted. She is not diaphoretic.   Psychiatric: She has a normal mood and affect. Her speech is normal and behavior is normal. Thought content normal. Cognition and memory are normal.     Assessment:      1. Upper respiratory tract infection, unspecified type    2. Cough    3. Sore throat    4. Body aches    5. Essential hypertension       Plan:   Upper respiratory tract infection, unspecified type    Cough  -     promethazine-dextromethorphan (PROMETHAZINE-DM) 6.25-15 mg/5 mL Syrp; Take 5 mLs by mouth nightly as needed (Cough).  Dispense: 120 mL; Refill: 0    Sore throat  -     POCT rapid strep A  -     Strep A culture, throat  -     (Magic mouthwash) 1:1:1 Benadryl 12.5mg/5ml liq, aluminum & magnesium hydroxide-simehticone (Maalox), lidocaine viscous 2%; Swish and spit 15 mLs every 4 (four) hours as needed (throat pain).  Dispense: 240 mL; Refill: 0    Body aches  -     Influenza A & B by Molecular    Essential hypertension   Controlled on current regimen     Advised patient based on time frame and symptomology this is likely a viral upper respiratory infection.  It does not require antibiotics at this time.    Will treat symptoms with OTC meds.  If no improvement, or if condition worsens, follow up with PCP.     Discussed worsening signs/symptoms and when to return to clinic or go to ED.   Patient  expresses understanding and agrees with treatment plan.

## 2019-11-24 LAB — BACTERIA THROAT CULT: NORMAL

## 2019-12-19 ENCOUNTER — PATIENT OUTREACH (OUTPATIENT)
Dept: OTHER | Facility: OTHER | Age: 64
End: 2019-12-19

## 2019-12-19 NOTE — PROGRESS NOTES
Digital Medicine: Health  Follow-Up    I called patient to follow up. She has been dealing with a bad sinus infection for the past month. She has been alternating between Zyrtec D and regular Zyrtec for the past few weeks, but now she is only taking the Zyrtec. She notes that she has been watching her sodium. She hasn't been able to do as much walking as she used to due to her sickness, but she hopes to start that back up soon. I will follow up in January.

## 2020-01-14 ENCOUNTER — DOCUMENTATION ONLY (OUTPATIENT)
Dept: OTHER | Facility: OTHER | Age: 65
End: 2020-01-14

## 2020-01-14 NOTE — PROGRESS NOTES
Patient recently started obtaining blood pressure readings again. Will defer next PharmD outreach for 2 weeks until adequate readings available for review.

## 2020-01-16 ENCOUNTER — PATIENT OUTREACH (OUTPATIENT)
Dept: OTHER | Facility: OTHER | Age: 65
End: 2020-01-16

## 2020-01-16 NOTE — PROGRESS NOTES
Digital Medicine: Health  Follow-Up    I called Mrs. Michele to follow up. She states that she has not charged her blood pressure cuff since she received it, so I told her to charge it tonight. I also encouraged her to try to take more readings. She is only taking about one a week. 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     The history is provided by the patient. No  was used.     Follow Up  Follow-up reason(s): reading review      Readings are missing.   patient reminder needed.  Routine Education Topics: eating patterns, meal planning and physical activity        INTERVENTION(S)  reviewed monitoring technique, encouragement/support, denied resources and denied questions    PLAN  patient verbalizes understanding and continue monitoring      There are no preventive care reminders to display for this patient.    Last 5 Patient Entered Readings                                      Current 30 Day Average: 138/81     Recent Readings 1/9/2020 1/9/2020 12/20/2019 12/20/2019 12/15/2019    SBP (mmHg) 136 149 140 148 122    DBP (mmHg) 72 83 85 83 73    Pulse 76 76 78 81 80                      Diet Screening   No change to diet.      The patient drinks 16 ounces of water per day.    She has the following dietary restrictions: low sodium diet    She states that she is reading nutrition labels and watching what she's eating in regards to sodium and sugar. She is drinking one 8 ounce cup of water in the morning and one 8 ounce cup in the evening. She has cut down on her sugary drinks.     Intervention(s): meal planning, reducing sodium intake, reading food labels and increasing water intake    Assigning the following patient goals: maintain low sodium diet    Physical Activity Screening   No change to exercise routine.      Patient has the following chronic pain: arthritis    She identified the following barriers to physical activity: pain/injury/recent surgery and weather    Mrs. Michele  continues to get her 10,000 steps while at work. The weather has been bad lately, so she hasn't walked outside of work.     Medication Adherence Screening   She misses doses: never      Patient identified the following reasons for non-compliance: none      SDOH

## 2020-02-14 ENCOUNTER — PATIENT OUTREACH (OUTPATIENT)
Dept: OTHER | Facility: OTHER | Age: 65
End: 2020-02-14

## 2020-02-21 NOTE — PROGRESS NOTES
No blood pressure readings since 01/25/20.     Will place health  task.     Will defer next PharmD outreach 2 weeks.

## 2020-03-10 NOTE — PROGRESS NOTES
No blood pressure readings since 01/25/20.      Health  scheduled to reach out in next week.      Will defer next PharmD outreach 2 weeks.

## 2020-03-16 NOTE — PROGRESS NOTES
Digital Medicine: Health  Follow-Up    I called Mrs. Michele to follow up. She notes that she has a lot going on, so she hasn't been diligent about taking her BP readings. We talked about setting a day during the week to take a reading. She notes that Saturday's could work for her.     The history is provided by the patient. No  was used.     Follow Up  Follow-up reason(s): reading review      Readings are missing.   patient reminder needed.      INTERVENTION(S)  encouragement/support, denied resources and denied questions    PLAN  patient verbalizes understanding, patient amenable to changes and continue monitoring      There are no preventive care reminders to display for this patient.    Last 5 Patient Entered Readings                                      Current 30 Day Average:      Recent Readings 1/25/2020 1/25/2020 1/9/2020 1/9/2020 12/20/2019    SBP (mmHg) 142 141 136 149 140    DBP (mmHg) 89 87 72 83 85    Pulse 74 78 76 76 78                      Diet Screening   No change to diet.      The patient drinks 64 ounces of water per day.    She has the following dietary restrictions: low sodium diet    She notes that she is still monitoring her sodium intake. She drinks between 3-4 bottles of water. She is reading nutrition labels at this time. I encouraged her to keep up the great work.     Intervention(s): reducing sodium intake and increasing water intake    Assigning the following patient goals: maintain low sodium diet    Physical Activity Screening   No change to exercise routine.    When asked if exercising, patient responded: yes    Patient participates in the following activities: walking    She gets 10,000 steps routinely while at work.     Medication Adherence Screening   She did not miss a dose this month.      SDOH

## 2020-04-01 NOTE — PROGRESS NOTES
No blood pressure readings since 01/25/20.      No readings since health  reminder.     Will defer next PharmD outreach 2 weeks.

## 2020-04-13 ENCOUNTER — PATIENT OUTREACH (OUTPATIENT)
Dept: OTHER | Facility: OTHER | Age: 65
End: 2020-04-13

## 2020-04-13 NOTE — PROGRESS NOTES
Digital Medicine: Health  Follow-Up    I called Mrs. Michele to follow up and address lack of blood pressure readings. 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 She is having to check her temperature two times a day. She denies having any symptoms and her temperature is always normal. She reveals this time has been stressful and terrifying, and she would like to be put on hiatus for a weeks until this dies down.     The history is provided by the patient. No  was used.     Follow Up  Follow-up reason(s): reading review      Readings are missing.   patient reminder needed.      Intervention/Plan    There are no preventive care reminders to display for this patient.    Last 5 Patient Entered Readings                                      Current 30 Day Average:      Recent Readings 1/25/2020 1/25/2020 1/9/2020 1/9/2020 12/20/2019    SBP (mmHg) 142 141 136 149 140    DBP (mmHg) 89 87 72 83 85    Pulse 74 78 76 76 78                  Screenings    SDOH

## 2020-05-06 ENCOUNTER — PATIENT MESSAGE (OUTPATIENT)
Dept: ADMINISTRATIVE | Facility: HOSPITAL | Age: 65
End: 2020-05-06

## 2020-05-15 NOTE — PROGRESS NOTES
Patient recently returned from hiatus.     No blood pressure readings since January. HC messaged today to encourage readings.     Will defer PharmD outreach 4 weeks.

## 2020-06-05 ENCOUNTER — PATIENT OUTREACH (OUTPATIENT)
Dept: OTHER | Facility: OTHER | Age: 65
End: 2020-06-05

## 2020-06-19 NOTE — PROGRESS NOTES
No blood pressure readings since January. HC continuing to contact to encourage readings.      Will defer PharmD outreach 4 weeks.

## 2020-07-20 DIAGNOSIS — J30.2 SEASONAL ALLERGIC RHINITIS, UNSPECIFIED TRIGGER: Chronic | ICD-10-CM

## 2020-07-20 NOTE — TELEPHONE ENCOUNTER
No new care gaps identified.  Powered by [employer]. Reference number: [id number]. 7/20/2020 8:49:15 AM CDT

## 2020-07-21 RX ORDER — FLUTICASONE PROPIONATE 50 MCG
2 SPRAY, SUSPENSION (ML) NASAL 2 TIMES DAILY
Qty: 48 G | Refills: 1 | Status: SHIPPED | OUTPATIENT
Start: 2020-07-21 | End: 2021-01-26

## 2020-07-21 NOTE — TELEPHONE ENCOUNTER
Refill Authorization Note    is requesting a refill authorization.    Brief assessment and rationale for refill: APPROVE: prr               Medication reconciliation completed: No                         Comments:      Orders Placed This Encounter    fluticasone propionate (FLONASE) 50 mcg/actuation nasal spray      Requested Prescriptions   Signed Prescriptions Disp Refills    fluticasone propionate (FLONASE) 50 mcg/actuation nasal spray 48 g 1     Si sprays (100 mcg total) by Each Nostril route 2 (two) times daily.       Ear, Nose, and Throat: Nasal Preparations - Corticosteroids Failed - 2020  8:48 AM        Failed - An appropriate indication is on the problem list     Allergic Rhinitis  Sinusitis  Seasonal Allergies              Passed - Patient is at least 18 years old        Passed - Office visit in past 12 months or future 90 days.     Recent Outpatient Visits            8 months ago Upper respiratory tract infection, unspecified type    [facility] Zuri White PA-C    8 months ago Gastroesophageal reflux disease, esophagitis presence not specified    [facility] Kulwant Sullivan Jr., MD    11 months ago Preventative health care    [facility] Criss Carreon PA-C    1 year ago Sebaceous cyst    [facility] Wendie Patel NP    2 years ago Fever, unspecified fever cause    [facility] Kimberly Tucker NP                        Appointments  past 12m or future 3m with PCP    Date Provider   Last Visit   10/29/2019 Kulwant Sullivan Jr., MD   Next Visit   Visit date not found Kulwant Sullivan Jr., MD   ED visits in past 90 days: [unfilled]     Note composed:11:14 AM 2020

## 2020-08-12 NOTE — PROGRESS NOTES
Recently started monitoring bp readings again. Will defer outreach until additional readings available for review. Plan to out once adequate readings available.

## 2020-09-01 ENCOUNTER — PATIENT MESSAGE (OUTPATIENT)
Dept: OTHER | Facility: OTHER | Age: 65
End: 2020-09-01

## 2020-09-01 NOTE — PROGRESS NOTES
No readings in the past month. HC reminded patient today via message. Will continue to monitor and reach out.

## 2020-09-30 NOTE — PROGRESS NOTES
No readings since 08/08/20. HC continues with attempted outreach to encourage routine monitoring.      Will defer PharmD outreach until readings available for review.

## 2020-10-06 DIAGNOSIS — I10 ESSENTIAL HYPERTENSION: ICD-10-CM

## 2020-10-06 RX ORDER — HYDROCHLOROTHIAZIDE 25 MG/1
TABLET ORAL
Qty: 90 TABLET | Refills: 0 | Status: SHIPPED | OUTPATIENT
Start: 2020-10-06 | End: 2021-01-26

## 2020-10-28 NOTE — PROGRESS NOTES
No readings since 10/01/20 - minimal readings - appropriate at this time. HC continues with attempted outreach to encourage routine monitoring.      Will defer PharmD outreach until readings available for review.

## 2020-11-05 ENCOUNTER — PATIENT MESSAGE (OUTPATIENT)
Dept: ADMINISTRATIVE | Facility: OTHER | Age: 65
End: 2020-11-05

## 2020-11-10 DIAGNOSIS — M25.561 CHRONIC PAIN OF RIGHT KNEE: ICD-10-CM

## 2020-11-10 DIAGNOSIS — G89.29 CHRONIC PAIN OF RIGHT KNEE: ICD-10-CM

## 2020-11-10 RX ORDER — MELOXICAM 7.5 MG/1
TABLET ORAL
Qty: 90 TABLET | Refills: 3 | Status: SHIPPED | OUTPATIENT
Start: 2020-11-10 | End: 2021-10-19

## 2020-11-10 NOTE — PROGRESS NOTES
Refill Routing Note   Medication(s) are not appropriate for processing by [facility] for the following reason(s):     - Outside of protocol    [facility] actions taken in this encounter: Route          Medication reconciliation completed: No   Automatic Epic Generated Protocol Data:        Requested Prescriptions   Pending Prescriptions Disp Refills    meloxicam (MOBIC) 7.5 MG tablet 90 tablet 3     Sig: TAKE 1 TABLET BY MOUTH DAILY WITH FOOD AS NEEDED FOR PAIN       NSAIDs Protocol Failed - 11/10/2020  2:07 PM        Failed - Serum Creatinine less than 1.4 on file in the past 12 months     Lab Results   Component Value Date    CREATININE 0.9 07/29/2019    CREATININE 1.0 07/05/2018    CREATININE 0.9 12/15/2017     Lab Results   Component Value Date    EGFRNONAA >60.0 07/29/2019    EGFRNONAA >60.0 07/05/2018    EGFRNONAA >60.0 12/15/2017               Failed - HGB greater than 10 or HCT greater than 30 in past 12 months        Failed - AST in past 12 months      Lab Results   Component Value Date    AST 22 07/29/2019    AST 25 12/15/2017    AST 22 12/16/2016              Failed - Serum Potassium less than 5.2 on file in the past 12 months     Lab Results   Component Value Date    K 3.6 07/29/2019    K 3.8 07/05/2018    K 3.8 12/15/2017                  Failed - ALT less than 95 in past 12 months     Lab Results   Component Value Date    ALT 27 07/29/2019    ALT 30 12/15/2017    ALT 25 12/16/2016              Passed - Patient not currently pregnant        Passed - No positive pregnancy test in past 12 months         Passed - Visit with authorizing provider in past 12 months or upcoming 90 days        Passed - Blood Pressure below 139/89 on file in past 12 months      BP Readings from Last 3 Encounters:   11/22/19 120/78   10/29/19 (!) 142/98   07/29/19 (!) 142/80                   Appointments  past 12m or future 3m with PCP    Date Provider   Last Visit   10/29/2019 Kulwant Sullivan Jr., MD   Next Visit    11/17/2020 Kulwant Sullivan Jr., MD   ED visits in past 90 days: 0        Note composed:2:13 PM 11/10/2020

## 2020-11-15 ENCOUNTER — PATIENT MESSAGE (OUTPATIENT)
Dept: ADMINISTRATIVE | Facility: OTHER | Age: 65
End: 2020-11-15

## 2020-11-17 ENCOUNTER — OFFICE VISIT (OUTPATIENT)
Dept: FAMILY MEDICINE | Facility: CLINIC | Age: 65
End: 2020-11-17
Payer: COMMERCIAL

## 2020-11-17 ENCOUNTER — LAB VISIT (OUTPATIENT)
Dept: LAB | Facility: HOSPITAL | Age: 65
End: 2020-11-17
Attending: EMERGENCY MEDICINE
Payer: COMMERCIAL

## 2020-11-17 VITALS
DIASTOLIC BLOOD PRESSURE: 100 MMHG | HEART RATE: 71 BPM | WEIGHT: 226 LBS | BODY MASS INDEX: 35.39 KG/M2 | OXYGEN SATURATION: 96 % | SYSTOLIC BLOOD PRESSURE: 144 MMHG

## 2020-11-17 DIAGNOSIS — K21.9 GASTROESOPHAGEAL REFLUX DISEASE, UNSPECIFIED WHETHER ESOPHAGITIS PRESENT: Chronic | ICD-10-CM

## 2020-11-17 DIAGNOSIS — I10 ESSENTIAL HYPERTENSION: Chronic | ICD-10-CM

## 2020-11-17 DIAGNOSIS — Z00.00 PREVENTATIVE HEALTH CARE: Primary | ICD-10-CM

## 2020-11-17 DIAGNOSIS — Z12.11 COLON CANCER SCREENING: ICD-10-CM

## 2020-11-17 DIAGNOSIS — L40.9 PSORIASIS: Chronic | ICD-10-CM

## 2020-11-17 LAB
ALBUMIN SERPL BCP-MCNC: 4.3 G/DL (ref 3.5–5.2)
ANION GAP SERPL CALC-SCNC: 10 MMOL/L (ref 8–16)
BUN SERPL-MCNC: 22 MG/DL (ref 8–23)
CALCIUM SERPL-MCNC: 9.4 MG/DL (ref 8.7–10.5)
CHLORIDE SERPL-SCNC: 104 MMOL/L (ref 95–110)
CO2 SERPL-SCNC: 29 MMOL/L (ref 23–29)
CREAT SERPL-MCNC: 0.9 MG/DL (ref 0.5–1.4)
EST. GFR  (AFRICAN AMERICAN): >60 ML/MIN/1.73 M^2
EST. GFR  (NON AFRICAN AMERICAN): >60 ML/MIN/1.73 M^2
GLUCOSE SERPL-MCNC: 95 MG/DL (ref 70–110)
PHOSPHATE SERPL-MCNC: 3.9 MG/DL (ref 2.7–4.5)
POTASSIUM SERPL-SCNC: 3.8 MMOL/L (ref 3.5–5.1)
SODIUM SERPL-SCNC: 143 MMOL/L (ref 136–145)

## 2020-11-17 PROCEDURE — 1126F PR PAIN SEVERITY QUANTIFIED, NO PAIN PRESENT: ICD-10-PCS | Mod: S$GLB,,, | Performed by: EMERGENCY MEDICINE

## 2020-11-17 PROCEDURE — 99396 PREV VISIT EST AGE 40-64: CPT | Mod: S$GLB,,, | Performed by: EMERGENCY MEDICINE

## 2020-11-17 PROCEDURE — 80069 RENAL FUNCTION PANEL: CPT

## 2020-11-17 PROCEDURE — 3080F DIAST BP >= 90 MM HG: CPT | Mod: CPTII,S$GLB,, | Performed by: EMERGENCY MEDICINE

## 2020-11-17 PROCEDURE — 3080F PR MOST RECENT DIASTOLIC BLOOD PRESSURE >= 90 MM HG: ICD-10-PCS | Mod: CPTII,S$GLB,, | Performed by: EMERGENCY MEDICINE

## 2020-11-17 PROCEDURE — 99999 PR PBB SHADOW E&M-EST. PATIENT-LVL IV: CPT | Mod: PBBFAC,,, | Performed by: EMERGENCY MEDICINE

## 2020-11-17 PROCEDURE — 3077F SYST BP >= 140 MM HG: CPT | Mod: CPTII,S$GLB,, | Performed by: EMERGENCY MEDICINE

## 2020-11-17 PROCEDURE — 3008F PR BODY MASS INDEX (BMI) DOCUMENTED: ICD-10-PCS | Mod: CPTII,S$GLB,, | Performed by: EMERGENCY MEDICINE

## 2020-11-17 PROCEDURE — 1126F AMNT PAIN NOTED NONE PRSNT: CPT | Mod: S$GLB,,, | Performed by: EMERGENCY MEDICINE

## 2020-11-17 PROCEDURE — 3077F PR MOST RECENT SYSTOLIC BLOOD PRESSURE >= 140 MM HG: ICD-10-PCS | Mod: CPTII,S$GLB,, | Performed by: EMERGENCY MEDICINE

## 2020-11-17 PROCEDURE — 3008F BODY MASS INDEX DOCD: CPT | Mod: CPTII,S$GLB,, | Performed by: EMERGENCY MEDICINE

## 2020-11-17 PROCEDURE — 99396 PR PREVENTIVE VISIT,EST,40-64: ICD-10-PCS | Mod: S$GLB,,, | Performed by: EMERGENCY MEDICINE

## 2020-11-17 PROCEDURE — 99999 PR PBB SHADOW E&M-EST. PATIENT-LVL IV: ICD-10-PCS | Mod: PBBFAC,,, | Performed by: EMERGENCY MEDICINE

## 2020-11-17 PROCEDURE — 36415 COLL VENOUS BLD VENIPUNCTURE: CPT | Mod: PO

## 2020-11-17 NOTE — PROGRESS NOTES
Subjective:   THIS NOTE IS DONE WITH VOICE RECOGNITION        Patient ID: Leny Tucker is a 65 y.o. female.    Chief Complaint: Annual Exam      HPI     She is in today to review her medical problems, on an annual basis.    She continues to do well.  She is working as a nurse.  She has been able to meet all her demands as a nurse.  She does have concerns about COVID, but is very knowledgeable about prevention and treatment.    Here to follow up on blood pressure.  Taking current medications.    BP Readings from Last 3 Encounters:   11/17/20 (!) 144/100   11/22/19 120/78   10/29/19 (!) 142/98     Right knee pain responsive to joint injection.  The importance of regular exercise and weight loss reviewed with her.    Her gastroesophageal reflux disease has been well controlled, no melena or hematochezia.    She is due for follow-up on her colorectal cancer screening.    Continued breast cancer screening recommended.  She has no symptoms.    No change in social history, surgical history, medical history, or family history.      Immunization History   Administered Date(s) Administered    Hepatitis B, Adult 12/16/2016    Influenza - Quadrivalent 09/25/2019    Influenza - Quadrivalent - PF *Preferred* (6 months and older) 10/14/2020    Tdap 06/14/2017     Shingrix (recombinant shingles vaccine) has been discussed and recommended.    Current Outpatient Medications   Medication Sig Dispense Refill    cetirizine (ZYRTEC) 10 MG tablet Take 10 mg by mouth once daily.      fluticasone propionate (FLONASE) 50 mcg/actuation nasal spray 2 sprays (100 mcg total) by Each Nostril route 2 (two) times daily. 48 g 1    hydroCHLOROthiazide (HYDRODIURIL) 25 MG tablet TAKE 1 TABLET(25 MG) BY MOUTH EVERY DAY 90 tablet 0    ketoconazole (NIZORAL) 2 % shampoo Apply topically once daily. 120 mL 6    meloxicam (MOBIC) 7.5 MG tablet TAKE 1 TABLET BY MOUTH DAILY WITH FOOD AS NEEDED FOR PAIN 90 tablet 3    multivitamin capsule Take 1  capsule by mouth once daily.      ranitidine (ZANTAC) 150 MG tablet TAKE 1 TABLET BY MOUTH TWICE A  tablet 0    (Magic mouthwash) 1:1:1 Benadryl 12.5mg/5ml liq, aluminum & magnesium hydroxide-simehticone (Maalox), lidocaine viscous 2% Swish and spit 15 mLs every 4 (four) hours as needed (throat pain). (Patient not taking: Reported on 11/17/2020) 240 mL 0    albuterol 90 mcg/actuation inhaler Inhale 2 puffs into the lungs every 6 (six) hours as needed for Wheezing. Rescue (Patient not taking: Reported on 10/29/2019) 18 g 0    azelastine (ASTELIN) 137 mcg (0.1 %) nasal spray 1 spray (137 mcg total) by Nasal route 2 (two) times daily. 30 mL 0    diclofenac sodium (VOLTAREN) 1 % Gel Apply 2 g topically 4 (four) times daily. 100 g 2    promethazine-dextromethorphan (PROMETHAZINE-DM) 6.25-15 mg/5 mL Syrp Take 5 mLs by mouth nightly as needed (Cough). (Patient not taking: Reported on 11/17/2020) 120 mL 0     No current facility-administered medications for this visit.          Review of Systems   Constitutional: Negative for activity change, appetite change, chills, diaphoresis, fatigue, fever and unexpected weight change.   HENT: Negative for congestion, ear pain, hearing loss, rhinorrhea, trouble swallowing and voice change.    Eyes: Negative for pain and visual disturbance.   Respiratory: Negative for cough, chest tightness and shortness of breath.    Cardiovascular: Negative for chest pain, palpitations and leg swelling.   Gastrointestinal: Negative for abdominal distention, abdominal pain and blood in stool.   Genitourinary: Negative for difficulty urinating, flank pain, frequency and urgency.   Musculoskeletal: Positive for arthralgias (right knee pain). Negative for back pain, joint swelling, myalgias, neck pain and neck stiffness.   Skin: Negative for pallor and rash.        Spots on legs   Neurological: Negative for dizziness, tremors, syncope, weakness and headaches.   Hematological: Negative for  adenopathy.   Psychiatric/Behavioral: Negative for dysphoric mood and sleep disturbance. The patient is not nervous/anxious.        Objective:      Physical Exam  Vitals signs reviewed.   Constitutional:       General: She is not in acute distress.     Appearance: Normal appearance. She is well-developed.      Comments: She is moderately overweight.   HENT:      Head: Normocephalic and atraumatic.      Right Ear: Tympanic membrane and ear canal normal.      Left Ear: Tympanic membrane and ear canal normal.      Nose: Nose normal.      Mouth/Throat:      Mouth: Mucous membranes are moist.      Pharynx: Oropharynx is clear.   Eyes:      General: No scleral icterus.     Conjunctiva/sclera: Conjunctivae normal.      Pupils: Pupils are equal, round, and reactive to light.   Neck:      Musculoskeletal: Normal range of motion and neck supple.      Thyroid: No thyromegaly.      Vascular: No JVD.   Cardiovascular:      Rate and Rhythm: Normal rate and regular rhythm.      Pulses: Normal pulses.           Carotid pulses are 2+ on the right side and 2+ on the left side.       Radial pulses are 2+ on the right side and 2+ on the left side.        Femoral pulses are 2+ on the right side and 2+ on the left side.       Popliteal pulses are 2+ on the right side and 2+ on the left side.        Dorsalis pedis pulses are 2+ on the right side and 2+ on the left side.        Posterior tibial pulses are 2+ on the right side and 2+ on the left side.      Heart sounds: Normal heart sounds. No murmur.      Comments: Varicose veins, Right lower leg.  No ulceration.  Pulmonary:      Effort: Pulmonary effort is normal.      Breath sounds: Normal breath sounds. No wheezing or rales.   Abdominal:      General: Bowel sounds are normal. There is no distension.      Palpations: Abdomen is soft.      Tenderness: There is no abdominal tenderness.   Musculoskeletal: Normal range of motion.         General: No tenderness.   Lymphadenopathy:       Cervical: No cervical adenopathy.   Skin:     General: Skin is warm and dry.      Capillary Refill: Capillary refill takes less than 2 seconds.      Findings: No erythema or rash.      Comments: She does have benign-appearing pigmented lesions on her lower legs.  These are flat, with sharp borders.    Her psoriasis is well controlled.  She does have some flaking around the margins of the scalp.  No major plaques appreciated.   Neurological:      Mental Status: She is alert and oriented to person, place, and time.      Cranial Nerves: No cranial nerve deficit.      Coordination: Coordination normal.      Deep Tendon Reflexes: Reflexes are normal and symmetric.   Psychiatric:         Mood and Affect: Mood normal.         Behavior: Behavior normal.         Thought Content: Thought content normal.         Judgment: Judgment normal.         Assessment:       1. Preventative health care    2. Essential hypertension    3. Psoriasis    4. Colon cancer screening    5. Gastroesophageal reflux disease, unspecified whether esophagitis present        Plan:       1. Preventative health care  Colorectal cancer screening breast cancer screening reviewed  Importance of good control overweight a regular exercise reviewed  Recommendations for primarily plant based diet  Encouraged to manage stress as effectively as possible, ideally by increasing exercise in improving sleep.    2. Essential hypertension  Blood pressure targets reviewed  Have recommended additional blood pressure readings either at work and/or home.  Blood pressure target is less than 140/90  Update renal functions    - Renal Function Panel; Future    3. Psoriasis  Control  No change in therapy recommended    4. Colon cancer screening  Routine fecal immune testing recommended, annually    - Fecal Immunochemical Test (iFOBT); Future    5. Gastroesophageal reflux disease, unspecified whether esophagitis present  Stable  Continue ranitidine  Try to limit the amount of  nonsteroidal anti-inflammatory medications she is using.

## 2020-11-20 ENCOUNTER — LAB VISIT (OUTPATIENT)
Dept: LAB | Facility: HOSPITAL | Age: 65
End: 2020-11-20
Attending: EMERGENCY MEDICINE
Payer: COMMERCIAL

## 2020-11-20 DIAGNOSIS — Z12.11 COLON CANCER SCREENING: ICD-10-CM

## 2020-11-20 PROCEDURE — 82274 ASSAY TEST FOR BLOOD FECAL: CPT

## 2020-11-25 LAB — HEMOCCULT STL QL IA: NEGATIVE

## 2020-11-25 NOTE — PROGRESS NOTES
Last 5 Patient Entered Readings                                      Current 30 Day Average:      Recent Readings 10/1/2020 10/1/2020 8/8/2020 8/8/2020 8/7/2020    SBP (mmHg) 132 143 141 151 140    DBP (mmHg) 80 81 86 83 84    Pulse 79 79 83 86 69        Continues without regular readings. HC reaching out to encourage monitoring. Will defer PharmD outreach.

## 2020-12-22 NOTE — PROGRESS NOTES
Last BP reading 10/01/20. HC continues to reach out to encourage readings.      Will defer PharmD outreach until readings available for review.

## 2021-01-25 DIAGNOSIS — J30.2 SEASONAL ALLERGIC RHINITIS, UNSPECIFIED TRIGGER: Chronic | ICD-10-CM

## 2021-01-26 RX ORDER — FLUTICASONE PROPIONATE 50 MCG
SPRAY, SUSPENSION (ML) NASAL
Qty: 48 G | Refills: 3 | Status: SHIPPED | OUTPATIENT
Start: 2021-01-26 | End: 2022-02-09

## 2021-01-27 NOTE — PROGRESS NOTES
Last BP reading 10/01/20. Will place task for HC to reach out and encourage readings.      Will defer PharmD outreach until readings available for review.

## 2021-02-17 DIAGNOSIS — Z12.31 OTHER SCREENING MAMMOGRAM: ICD-10-CM

## 2021-04-06 ENCOUNTER — PATIENT MESSAGE (OUTPATIENT)
Dept: ADMINISTRATIVE | Facility: HOSPITAL | Age: 66
End: 2021-04-06

## 2021-07-07 ENCOUNTER — PATIENT MESSAGE (OUTPATIENT)
Dept: ADMINISTRATIVE | Facility: HOSPITAL | Age: 66
End: 2021-07-07

## 2021-10-07 ENCOUNTER — IMMUNIZATION (OUTPATIENT)
Dept: FAMILY MEDICINE | Facility: CLINIC | Age: 66
End: 2021-10-07
Payer: COMMERCIAL

## 2021-10-07 DIAGNOSIS — Z23 NEED FOR VACCINATION: Primary | ICD-10-CM

## 2021-10-07 PROCEDURE — 0003A COVID-19, MRNA, LNP-S, PF, 30 MCG/0.3 ML DOSE VACCINE: CPT | Mod: PBBFAC | Performed by: FAMILY MEDICINE

## 2021-10-07 PROCEDURE — 91300 COVID-19, MRNA, LNP-S, PF, 30 MCG/0.3 ML DOSE VACCINE: CPT | Mod: PBBFAC | Performed by: FAMILY MEDICINE

## 2021-10-19 DIAGNOSIS — G89.29 CHRONIC PAIN OF RIGHT KNEE: ICD-10-CM

## 2021-10-19 DIAGNOSIS — M25.561 CHRONIC PAIN OF RIGHT KNEE: ICD-10-CM

## 2021-10-19 RX ORDER — MELOXICAM 7.5 MG/1
TABLET ORAL
Qty: 90 TABLET | Refills: 3 | Status: SHIPPED | OUTPATIENT
Start: 2021-10-19 | End: 2022-10-13

## 2021-11-03 ENCOUNTER — OFFICE VISIT (OUTPATIENT)
Dept: FAMILY MEDICINE | Facility: CLINIC | Age: 66
End: 2021-11-03
Payer: COMMERCIAL

## 2021-11-03 VITALS
SYSTOLIC BLOOD PRESSURE: 136 MMHG | BODY MASS INDEX: 35.84 KG/M2 | HEART RATE: 78 BPM | WEIGHT: 228.38 LBS | DIASTOLIC BLOOD PRESSURE: 74 MMHG | HEIGHT: 67 IN | OXYGEN SATURATION: 99 %

## 2021-11-03 DIAGNOSIS — I10 ESSENTIAL HYPERTENSION: Chronic | ICD-10-CM

## 2021-11-03 DIAGNOSIS — E66.01 SEVERE OBESITY (BMI 35.0-39.9) WITH COMORBIDITY: ICD-10-CM

## 2021-11-03 DIAGNOSIS — F41.9 ANXIETY: ICD-10-CM

## 2021-11-03 DIAGNOSIS — Z00.00 PREVENTATIVE HEALTH CARE: Primary | ICD-10-CM

## 2021-11-03 PROCEDURE — 99999 PR PBB SHADOW E&M-EST. PATIENT-LVL IV: CPT | Mod: PBBFAC,,, | Performed by: PHYSICIAN ASSISTANT

## 2021-11-03 PROCEDURE — 99999 PR PBB SHADOW E&M-EST. PATIENT-LVL IV: ICD-10-PCS | Mod: PBBFAC,,, | Performed by: PHYSICIAN ASSISTANT

## 2021-11-03 PROCEDURE — 99214 OFFICE O/P EST MOD 30 MIN: CPT | Mod: S$GLB,,, | Performed by: PHYSICIAN ASSISTANT

## 2021-11-03 PROCEDURE — 1159F MED LIST DOCD IN RCRD: CPT | Mod: CPTII,S$GLB,, | Performed by: PHYSICIAN ASSISTANT

## 2021-11-03 PROCEDURE — 3008F BODY MASS INDEX DOCD: CPT | Mod: CPTII,S$GLB,, | Performed by: PHYSICIAN ASSISTANT

## 2021-11-03 PROCEDURE — 3008F PR BODY MASS INDEX (BMI) DOCUMENTED: ICD-10-PCS | Mod: CPTII,S$GLB,, | Performed by: PHYSICIAN ASSISTANT

## 2021-11-03 PROCEDURE — 3075F PR MOST RECENT SYSTOLIC BLOOD PRESS GE 130-139MM HG: ICD-10-PCS | Mod: CPTII,S$GLB,, | Performed by: PHYSICIAN ASSISTANT

## 2021-11-03 PROCEDURE — 1159F PR MEDICATION LIST DOCUMENTED IN MEDICAL RECORD: ICD-10-PCS | Mod: CPTII,S$GLB,, | Performed by: PHYSICIAN ASSISTANT

## 2021-11-03 PROCEDURE — 3075F SYST BP GE 130 - 139MM HG: CPT | Mod: CPTII,S$GLB,, | Performed by: PHYSICIAN ASSISTANT

## 2021-11-03 PROCEDURE — 3078F PR MOST RECENT DIASTOLIC BLOOD PRESSURE < 80 MM HG: ICD-10-PCS | Mod: CPTII,S$GLB,, | Performed by: PHYSICIAN ASSISTANT

## 2021-11-03 PROCEDURE — 99214 PR OFFICE/OUTPT VISIT, EST, LEVL IV, 30-39 MIN: ICD-10-PCS | Mod: S$GLB,,, | Performed by: PHYSICIAN ASSISTANT

## 2021-11-03 PROCEDURE — 3078F DIAST BP <80 MM HG: CPT | Mod: CPTII,S$GLB,, | Performed by: PHYSICIAN ASSISTANT

## 2021-11-03 RX ORDER — BUSPIRONE HYDROCHLORIDE 5 MG/1
5 TABLET ORAL 2 TIMES DAILY
Qty: 40 TABLET | Refills: 3 | Status: SHIPPED | OUTPATIENT
Start: 2021-11-03 | End: 2022-05-16

## 2021-12-23 ENCOUNTER — HOSPITAL ENCOUNTER (OUTPATIENT)
Dept: RADIOLOGY | Facility: HOSPITAL | Age: 66
Discharge: HOME | End: 2021-12-23
Attending: EMERGENCY MEDICINE
Payer: COMMERCIAL

## 2021-12-23 DIAGNOSIS — Z12.31 OTHER SCREENING MAMMOGRAM: ICD-10-CM

## 2021-12-23 PROCEDURE — 77063 MAMMO DIGITAL SCREENING BILAT WITH TOMO: ICD-10-PCS | Mod: 26,,, | Performed by: RADIOLOGY

## 2021-12-23 PROCEDURE — 77063 BREAST TOMOSYNTHESIS BI: CPT | Mod: 26,,, | Performed by: RADIOLOGY

## 2021-12-23 PROCEDURE — 77067 SCR MAMMO BI INCL CAD: CPT | Mod: 26,,, | Performed by: RADIOLOGY

## 2021-12-23 PROCEDURE — 77067 MAMMO DIGITAL SCREENING BILAT WITH TOMO: ICD-10-PCS | Mod: 26,,, | Performed by: RADIOLOGY

## 2021-12-23 PROCEDURE — 77067 SCR MAMMO BI INCL CAD: CPT | Mod: TC,PO

## 2022-01-03 ENCOUNTER — PATIENT OUTREACH (OUTPATIENT)
Dept: ADMINISTRATIVE | Facility: HOSPITAL | Age: 67
End: 2022-01-03
Payer: COMMERCIAL

## 2022-01-03 ENCOUNTER — PATIENT MESSAGE (OUTPATIENT)
Dept: ADMINISTRATIVE | Facility: HOSPITAL | Age: 67
End: 2022-01-03
Payer: COMMERCIAL

## 2022-01-03 NOTE — PROGRESS NOTES
Non-compliant report chart audits for COLON CANCER SCREENING. Chart review completed for HM test overdue (mammograms, Colonoscopies, pap smears, DM labs, and/or EYE EXAMs)      Care Everywhere and media, updates requested and reviewed.      Outreach to patient in reference to colon cancer screening.  RE:  Patient needs colon cancer screening    LMOR to return call to clinic    Message sent to patient's portal.              Outreach:  Colon cancer screening

## 2022-01-14 DIAGNOSIS — M12.9 ARTHROPATHY: ICD-10-CM

## 2022-01-14 RX ORDER — DICLOFENAC SODIUM 10 MG/G
2 GEL TOPICAL 4 TIMES DAILY
Qty: 100 G | Refills: 2 | Status: SHIPPED | OUTPATIENT
Start: 2022-01-14 | End: 2024-07-18

## 2022-01-14 NOTE — TELEPHONE ENCOUNTER
----- Message from Qiana Jones sent at 1/14/2022 10:18 AM CST -----  Contact: patient  Type:  RX Refill Request    Who Called:  patient  Refill or New Rx:  refill  RX Name and Strength:  diclofenac sodium (VOLTAREN) 1 % Gel  How is the patient currently taking it? (ex. 1XDay):  as needed  Is this a 30 day or 90 day RX:  90  Preferred Pharmacy with phone number:    [id number] [place] [address] AT [facility], [facility] & [address] [address]  Phone: [phone] Fax: [phone]  Local or Mail Order:  local  Ordering Provider:  Nate Sykes Call Back Number:  [phone] (home)   Additional Information:

## 2022-01-24 DIAGNOSIS — J30.2 SEASONAL ALLERGIC RHINITIS, UNSPECIFIED TRIGGER: Chronic | ICD-10-CM

## 2022-01-24 NOTE — TELEPHONE ENCOUNTER
No new care gaps identified.  Powered by ROOOMERS by [employer]. Reference number: [id number].   1/24/2022 12:17:45 PM CST

## 2022-02-09 RX ORDER — FLUTICASONE PROPIONATE 50 MCG
2 SPRAY, SUSPENSION (ML) NASAL 2 TIMES DAILY
Qty: 48 G | Refills: 0 | Status: SHIPPED | OUTPATIENT
Start: 2022-02-09 | End: 2022-02-09

## 2022-02-09 NOTE — TELEPHONE ENCOUNTER
Refill Authorization Note   Leny Tucker  is requesting a refill authorization.  Brief Assessment and Rationale for Refill:  Approve     Medication Therapy Plan:       Medication Reconciliation Completed: No   Comments:   --->Care Gap information included below if applicable.       Requested Prescriptions   Pending Prescriptions Disp Refills    fluticasone propionate (FLONASE) 50 mcg/actuation nasal spray [Pharmacy Med Name: FLUTICASONE 50MCG NASAL SP (120) RX] 48 g 0     Si sprays (100 mcg total) by Each Nostril route 2 (two) times daily.       Ear, Nose, and Throat: Nasal Preparations - Corticosteroids Passed - 2022  8:32 AM        Passed - Patient is at least 18 years old        Passed - Valid encounter within last 15 months     Recent Visits  Date Type Provider Dept   20 Office Visit Kulwant Sulilvan Jr., MD [facility]   Showing recent visits within past 720 days and meeting all other requirements  Future Appointments  No visits were found meeting these conditions.  Showing future appointments within next 150 days and meeting all other requirements                    Appointments  past 12m or future 3m with PCP    Date Provider   Last Visit   2020 Kulwant Sullivan Jr., MD   Next Visit   Visit date not found Kulwant Sullivan Jr., MD   ED visits in past 90 days: 0     Note composed:8:39 AM 2022

## 2022-03-28 ENCOUNTER — TELEPHONE (OUTPATIENT)
Dept: ORTHOPEDICS | Facility: CLINIC | Age: 67
End: 2022-03-28
Payer: COMMERCIAL

## 2022-03-28 NOTE — TELEPHONE ENCOUNTER
----- Message from Eddie Sung sent at 3/28/2022 11:17 AM CDT -----  Who Called: VARINDER ARGUELLO    What is the request in detail: Pt called in to have right knee injection scheduled. Please advise.     Can the clinic reply by [facility]? No     Best Call Back Number: [phone]    Additional Information:

## 2022-04-06 DIAGNOSIS — M25.561 RIGHT KNEE PAIN, UNSPECIFIED CHRONICITY: Primary | ICD-10-CM

## 2022-04-10 ENCOUNTER — PATIENT OUTREACH (OUTPATIENT)
Dept: ADMINISTRATIVE | Facility: OTHER | Age: 67
End: 2022-04-10
Payer: COMMERCIAL

## 2022-04-10 DIAGNOSIS — Z12.11 COLON CANCER SCREENING: Primary | ICD-10-CM

## 2022-04-10 NOTE — PROGRESS NOTES
LINKS immunization registry updated  Care Everywhere updated  Health Maintenance updated  Chart reviewed for overdue [facility]active [facility] Encounters (FABIEN) health maintenance testing (CRS, Breast Ca, Diabetic Eye Exam)   Orders entered: fit kit

## 2022-04-13 ENCOUNTER — OFFICE VISIT (OUTPATIENT)
Dept: ORTHOPEDICS | Facility: CLINIC | Age: 67
End: 2022-04-13
Payer: COMMERCIAL

## 2022-04-13 ENCOUNTER — HOSPITAL ENCOUNTER (OUTPATIENT)
Dept: RADIOLOGY | Facility: HOSPITAL | Age: 67
Discharge: HOME | End: 2022-04-13
Attending: ORTHOPAEDIC SURGERY
Payer: COMMERCIAL

## 2022-04-13 VITALS — RESPIRATION RATE: 18 BRPM | HEIGHT: 67 IN | WEIGHT: 228 LBS | BODY MASS INDEX: 35.79 KG/M2

## 2022-04-13 DIAGNOSIS — M25.561 RIGHT KNEE PAIN, UNSPECIFIED CHRONICITY: ICD-10-CM

## 2022-04-13 DIAGNOSIS — M17.10 ARTHRITIS OF KNEE: Primary | ICD-10-CM

## 2022-04-13 PROCEDURE — 3008F BODY MASS INDEX DOCD: CPT | Mod: CPTII,S$GLB,, | Performed by: ORTHOPAEDIC SURGERY

## 2022-04-13 PROCEDURE — 73564 XR KNEE ORTHO RIGHT WITH FLEXION: ICD-10-PCS | Mod: 26,RT,, | Performed by: RADIOLOGY

## 2022-04-13 PROCEDURE — 3008F PR BODY MASS INDEX (BMI) DOCUMENTED: ICD-10-PCS | Mod: CPTII,S$GLB,, | Performed by: ORTHOPAEDIC SURGERY

## 2022-04-13 PROCEDURE — 73562 X-RAY EXAM OF KNEE 3: CPT | Mod: 26,LT,, | Performed by: RADIOLOGY

## 2022-04-13 PROCEDURE — 1159F MED LIST DOCD IN RCRD: CPT | Mod: CPTII,S$GLB,, | Performed by: ORTHOPAEDIC SURGERY

## 2022-04-13 PROCEDURE — 99203 PR OFFICE/OUTPT VISIT, NEW, LEVL III, 30-44 MIN: ICD-10-PCS | Mod: 25,S$GLB,, | Performed by: ORTHOPAEDIC SURGERY

## 2022-04-13 PROCEDURE — 99999 PR PBB SHADOW E&M-EST. PATIENT-LVL IV: ICD-10-PCS | Mod: PBBFAC,,, | Performed by: ORTHOPAEDIC SURGERY

## 2022-04-13 PROCEDURE — 99999 PR PBB SHADOW E&M-EST. PATIENT-LVL IV: CPT | Mod: PBBFAC,,, | Performed by: ORTHOPAEDIC SURGERY

## 2022-04-13 PROCEDURE — 1160F PR REVIEW ALL MEDS BY PRESCRIBER/CLIN PHARMACIST DOCUMENTED: ICD-10-PCS | Mod: CPTII,S$GLB,, | Performed by: ORTHOPAEDIC SURGERY

## 2022-04-13 PROCEDURE — 73562 X-RAY EXAM OF KNEE 3: CPT | Mod: 59,TC,PO,LT

## 2022-04-13 PROCEDURE — 99203 OFFICE O/P NEW LOW 30 MIN: CPT | Mod: 25,S$GLB,, | Performed by: ORTHOPAEDIC SURGERY

## 2022-04-13 PROCEDURE — 73564 X-RAY EXAM KNEE 4 OR MORE: CPT | Mod: 26,RT,, | Performed by: RADIOLOGY

## 2022-04-13 PROCEDURE — 20610 LARGE JOINT ASPIRATION/INJECTION: R KNEE: ICD-10-PCS | Mod: RT,S$GLB,, | Performed by: ORTHOPAEDIC SURGERY

## 2022-04-13 PROCEDURE — 73562 XR KNEE ORTHO RIGHT WITH FLEXION: ICD-10-PCS | Mod: 26,LT,, | Performed by: RADIOLOGY

## 2022-04-13 PROCEDURE — 1159F PR MEDICATION LIST DOCUMENTED IN MEDICAL RECORD: ICD-10-PCS | Mod: CPTII,S$GLB,, | Performed by: ORTHOPAEDIC SURGERY

## 2022-04-13 PROCEDURE — 1160F RVW MEDS BY RX/DR IN RCRD: CPT | Mod: CPTII,S$GLB,, | Performed by: ORTHOPAEDIC SURGERY

## 2022-04-13 PROCEDURE — 20610 DRAIN/INJ JOINT/BURSA W/O US: CPT | Mod: RT,S$GLB,, | Performed by: ORTHOPAEDIC SURGERY

## 2022-04-13 RX ORDER — TRIAMCINOLONE ACETONIDE 40 MG/ML
40 INJECTION, SUSPENSION INTRA-ARTICULAR; INTRAMUSCULAR
Status: DISCONTINUED | OUTPATIENT
Start: 2022-04-13 | End: 2022-04-13 | Stop reason: HOSPADM

## 2022-04-13 RX ADMIN — TRIAMCINOLONE ACETONIDE 40 MG: 40 INJECTION, SUSPENSION INTRA-ARTICULAR; INTRAMUSCULAR at 09:04

## 2022-04-13 NOTE — PROCEDURES
Large Joint Aspiration/Injection: R knee    Date/Time: 4/13/2022 9:00 AM  Performed by: Dylan Carlos MD  Authorized by: Dylan Carlos MD     Consent Done?:  Yes (Verbal)  Indications:  Pain  Site marked: the procedure site was marked    Timeout: prior to procedure the correct patient, procedure, and site was verified    Local anesthetic:  Lidocaine 1% without epinephrine and bupivacaine 0.25% without epinephrine  Anesthetic total (ml):  6      Details:  Needle Size:  20 G  Approach:  Anterolateral  Location:  Knee  Site:  R knee  Medications:  40 mg triamcinolone acetonide 40 mg/mL  Patient tolerance:  Patient tolerated the procedure well with no immediate complications

## 2022-04-13 NOTE — PROGRESS NOTES
Past Medical History:   Diagnosis Date    Allergy to pollen     GERD (gastroesophageal reflux disease)        Past Surgical History:   Procedure Laterality Date    BREAST BIOPSY Bilateral     neg    FINGER SURGERY      bone lesion    HYSTERECTOMY      LIPOMA RESECTION      biopsy negative       Current Outpatient Medications   Medication Sig    cetirizine (ZYRTEC) 10 MG tablet Take 10 mg by mouth once daily.    fluticasone propionate (FLONASE) 50 mcg/actuation nasal spray SHAKE LIQUID AND USE 2 SPRAYS(100 MCG) IN EACH NOSTRIL TWICE DAILY    hydroCHLOROthiazide (HYDRODIURIL) 25 MG tablet TAKE 1 TABLET(25 MG) BY MOUTH EVERY DAY    ketoconazole (NIZORAL) 2 % shampoo Apply topically once daily.    meloxicam (MOBIC) 7.5 MG tablet TAKE 1 TABLET BY MOUTH DAILY WITH FOOD AS NEEDED FOR PAIN    multivitamin capsule Take 1 capsule by mouth once daily.    albuterol 90 mcg/actuation inhaler Inhale 2 puffs into the lungs every 6 (six) hours as needed for Wheezing. Rescue (Patient not taking: Reported on 10/29/2019)    azelastine (ASTELIN) 137 mcg (0.1 %) nasal spray 1 spray (137 mcg total) by Nasal route 2 (two) times daily.    busPIRone (BUSPAR) 5 MG Tab Take 1 tablet (5 mg total) by mouth 2 (two) times daily. (Patient not taking: Reported on 4/13/2022)    diclofenac sodium (VOLTAREN) 1 % Gel Apply 2 g topically 4 (four) times daily.     No current facility-administered medications for this visit.       Review of patient's allergies indicates:   Allergen Reactions    Doxycycline     Augmentin [amoxicillin-pot clavulanate] Rash    Sulfa (sulfonamide antibiotics) Rash       Family History   Problem Relation Age of Onset    Hypertension Brother     Hypertension Brother     Heart disease Father     Rheumatic fever Mother     Arthritis Mother         rheumatoid arthritis    Heart disease Mother        Social History     Socioeconomic History    Marital status:     Number of children: 2   Occupational  History    Occupation: RN     Employer: [employer]     Comment: [employer]      Employer: [employer]   Tobacco Use    Smoking status: Former Smoker     Packs/day: 1.50     Years: 10.00     Pack years: 15.00     Quit date: 1993     Years since quittin.0    Smokeless tobacco: Never Used   Substance and Sexual Activity    Alcohol use: No     Alcohol/week: 0.0 standard drinks    Drug use: No    Sexual activity: Not Currently     Partners: Male   Social History Narrative    Walking for exercise.  Three times a week.  15 minutes.       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  I saw her for this about 5 years ago.  We gave her a cortisone injection back then.  It worked great.  Started to have some more pain again back in January.  No new injury or trauma.  Is currently on Mobic.  Rates pain is a 5/10.    Review of Systems:    Constitution: Negative for chills, fever, and sweats.  Negative for unexplained weight loss.    HENT:  Negative for headaches and blurry vision.    Cardiovascular:Negative for chest pain or irregular heart beat. Negative for hypertension.    Respiratory:  Negative for cough and shortness of breath.    Gastrointestinal: Negative for abdominal pain, heartburn, melena, nausea, and vomitting.    Genitourinary:  Negative bladder incontinence and dysuria.    Musculoskeletal:  See HPI    Neurological: Negative for numbness.    Psychiatric/Behavioral: Negative for depression.  The patient is not nervous/anxious.      Endocrine: Negative for polyuria    Hematologic/Lymphatic: Negative for bleeding problem.  Does not bruise/bleed easily.    Skin: Negative for poor would healing and rash      Physical Examination:    Vital Signs:    Vitals:    22 0841   Resp: 18       Body mass index is 35.71 kg/m².    This a  well-developed, well nourished patient in no acute distress.  They are alert and oriented and cooperative to examination.  Pt. walks without an antalgic gait.      Examination of the right knee shows no rashes or erythema. There are no masses ecchymosis or effusion. Patient has full range of motion from 0-130°. Patient is nontender to palpation over lateral joint line and moderately tender to palpation over the medial joint line. Patient has a - Lachman exam, - anterior drawer exam, and - posterior drawer exam. - Cate's exam. Knee is stable to varus and valgus stress. 5 out of 5 motor strength. Palpable distal pulses. Intact light touch sensation. Negative Patellofemoral crepitus    Examination of the left knee shows no rashes or erythema. There are no masses ecchymosis or effusion. Patient has full range of motion from 0-130°. Patient is nontender to palpation over lateral joint line and nontender to palpation over the medial joint line. Patient has a - Lachman exam, - anterior drawer exam, and - posterior drawer exam. - Cate's exam. Knee is stable to varus and valgus stress. 5 out of 5 motor strength. Palpable distal pulses. Intact light touch sensation. Negative Patellofemoral crepitus    X-rays: X-rays of the right knee are ordered and reviewed which show some moderate arthritic changes with medial narrowing.  Right is worse than left     Assessment:: Right knee arthritis    Plan:  I reviewed the findings with her today.  Patient wanted to try another steroid injection since it worked so well 5 years ago.  I agreed.  Follow-up if needed.    This note was created using  voice recognition software that occasionally misinterpreted phrases or words.    Consult note is delivered via Epic messaging service.

## 2022-04-22 DIAGNOSIS — I10 ESSENTIAL HYPERTENSION: ICD-10-CM

## 2022-04-22 RX ORDER — HYDROCHLOROTHIAZIDE 25 MG/1
25 TABLET ORAL DAILY
Qty: 90 TABLET | Refills: 0 | Status: SHIPPED | OUTPATIENT
Start: 2022-04-22 | End: 2022-05-16 | Stop reason: SDUPTHER

## 2022-04-22 NOTE — TELEPHONE ENCOUNTER
----- Message from Jacquelin Bolaños sent at 4/22/2022  9:09 AM CDT -----  Contact: Patient  Type:  RX Refill Request    Who Called:  Patient    Refill or New Rx Refill    RX Name and Strength: hydroCHLOROthiazide (HYDRODIURIL) 25 MG tablet      How is the patient currently taking it? (ex. 1XDay):     Is this a 30 day or 90 day RX 90    Preferred Pharmacy with phone number:    [facility] #[id number] & [address] [address]  Phone: [phone] Fax: [phone]    Local or Mail Order: Local     Ordering Provider: Dr Sullivan     Would the patient rather a call back or a response via [facility]?  Call    Best Call Back Number: [phone] (home)     Additional Information:

## 2022-04-22 NOTE — TELEPHONE ENCOUNTER
No new care gaps identified.  Powered by [employer] by Floop. Reference number: [id number].   4/22/2022 10:25:15 AM CDT

## 2022-05-06 ENCOUNTER — OFFICE VISIT (OUTPATIENT)
Dept: PODIATRY | Facility: CLINIC | Age: 67
End: 2022-05-06
Payer: COMMERCIAL

## 2022-05-06 DIAGNOSIS — M79.672 LEFT FOOT PAIN: ICD-10-CM

## 2022-05-06 DIAGNOSIS — M21.6X1 ACQUIRED EQUINUS DEFORMITY OF BOTH FEET: ICD-10-CM

## 2022-05-06 DIAGNOSIS — M72.2 PLANTAR FASCIITIS: Primary | ICD-10-CM

## 2022-05-06 DIAGNOSIS — M21.6X2 ACQUIRED EQUINUS DEFORMITY OF BOTH FEET: ICD-10-CM

## 2022-05-06 PROCEDURE — 1159F MED LIST DOCD IN RCRD: CPT | Mod: CPTII,S$GLB,, | Performed by: STUDENT IN AN ORGANIZED HEALTH CARE EDUCATION/TRAINING PROGRAM

## 2022-05-06 PROCEDURE — 1160F PR REVIEW ALL MEDS BY PRESCRIBER/CLIN PHARMACIST DOCUMENTED: ICD-10-PCS | Mod: CPTII,S$GLB,, | Performed by: STUDENT IN AN ORGANIZED HEALTH CARE EDUCATION/TRAINING PROGRAM

## 2022-05-06 PROCEDURE — 1159F PR MEDICATION LIST DOCUMENTED IN MEDICAL RECORD: ICD-10-PCS | Mod: CPTII,S$GLB,, | Performed by: STUDENT IN AN ORGANIZED HEALTH CARE EDUCATION/TRAINING PROGRAM

## 2022-05-06 PROCEDURE — 99203 PR OFFICE/OUTPT VISIT, NEW, LEVL III, 30-44 MIN: ICD-10-PCS | Mod: S$GLB,,, | Performed by: STUDENT IN AN ORGANIZED HEALTH CARE EDUCATION/TRAINING PROGRAM

## 2022-05-06 PROCEDURE — 1160F RVW MEDS BY RX/DR IN RCRD: CPT | Mod: CPTII,S$GLB,, | Performed by: STUDENT IN AN ORGANIZED HEALTH CARE EDUCATION/TRAINING PROGRAM

## 2022-05-06 PROCEDURE — 99203 OFFICE O/P NEW LOW 30 MIN: CPT | Mod: S$GLB,,, | Performed by: STUDENT IN AN ORGANIZED HEALTH CARE EDUCATION/TRAINING PROGRAM

## 2022-05-06 PROCEDURE — 99999 PR PBB SHADOW E&M-EST. PATIENT-LVL III: ICD-10-PCS | Mod: PBBFAC,,, | Performed by: STUDENT IN AN ORGANIZED HEALTH CARE EDUCATION/TRAINING PROGRAM

## 2022-05-06 PROCEDURE — 99999 PR PBB SHADOW E&M-EST. PATIENT-LVL III: CPT | Mod: PBBFAC,,, | Performed by: STUDENT IN AN ORGANIZED HEALTH CARE EDUCATION/TRAINING PROGRAM

## 2022-05-06 RX ORDER — METHYLPREDNISOLONE 4 MG/1
TABLET ORAL
Qty: 21 EACH | Refills: 0 | Status: SHIPPED | OUTPATIENT
Start: 2022-05-06 | End: 2022-05-16

## 2022-05-06 NOTE — PROGRESS NOTES
Chief Complaint   Patient presents with    Foot Pain     Left foot plantar fasc         MEDICAL DECISION MAKING           Problem List Items Addressed This Visit    None     Visit Diagnoses     Plantar fasciitis    -  Primary    Acquired equinus deformity of both feet        Left foot pain                I counseled the patient on the patient's conditions, their implications and medical management.     Plantar Fasciitis:  -Patient was given a printout of stretching exercises to stretch the achilles tendon and increase ankle dorsiflexion  -Patient instructed to ice with a frozen water bottle as instructed in the handout.  -Patient instructed to refrain from barefoot walking. I recommend Hoka slippers.  -Shoe gear was discussed with patient and recommended a supportive shoe with a stiff shank that doesn't flex at the arch. The shoe should also have an elevated heel. Some brands include Huizar, Asics and Hokas. The length of the shoe should accommodate the width of a thumb between the big toe and the edge of the shoe.    Patient was prescribed a medrol dose pack to help with inflammation.  Patient to continue Mobic to decrease the inflammatory process.    Patient to follow up in about 6 weeks.       HPI:       Leny Tucker is a 66 y.o. female who presents to clinic with concerns of left heel pain for a couple of weeks.     Pain is worse with weight bearing and first few steps after prolonged periods of rest.     Patient denies acute trauma to the affected area.   Treatment tried: none  Patient works as a floor nurse for [employer]      Patient Active Problem List   Diagnosis    Rhinitis    GERD (gastroesophageal reflux disease)    Psoriasis    Essential hypertension    Non morbid obesity due to excess calories         Current Outpatient Medications on File Prior to Visit   Medication Sig Dispense Refill    busPIRone (BUSPAR) 5 MG Tab Take 1 tablet (5 mg total) by mouth 2 (two) times daily. 40 tablet 3     cetirizine (ZYRTEC) 10 MG tablet Take 10 mg by mouth once daily.      fluticasone propionate (FLONASE) 50 mcg/actuation nasal spray SHAKE LIQUID AND USE 2 SPRAYS(100 MCG) IN EACH NOSTRIL TWICE DAILY 96 g 3    hydroCHLOROthiazide (HYDRODIURIL) 25 MG tablet Take 1 tablet (25 mg total) by mouth once daily. 90 tablet 0    ketoconazole (NIZORAL) 2 % shampoo Apply topically once daily. 120 mL 6    meloxicam (MOBIC) 7.5 MG tablet TAKE 1 TABLET BY MOUTH DAILY WITH FOOD AS NEEDED FOR PAIN 90 tablet 3    multivitamin capsule Take 1 capsule by mouth once daily.      albuterol 90 mcg/actuation inhaler Inhale 2 puffs into the lungs every 6 (six) hours as needed for Wheezing. Rescue (Patient not taking: Reported on 10/29/2019) 18 g 0    azelastine (ASTELIN) 137 mcg (0.1 %) nasal spray 1 spray (137 mcg total) by Nasal route 2 (two) times daily. 30 mL 0    diclofenac sodium (VOLTAREN) 1 % Gel Apply 2 g topically 4 (four) times daily. 100 g 2     No current facility-administered medications on file prior to visit.           Review of patient's allergies indicates:   Allergen Reactions    Doxycycline     Augmentin [amoxicillin-pot clavulanate] Rash    Sulfa (sulfonamide antibiotics) Rash         ROS:  General ROS: negative for  chills, fatigue or fever  Cardiovascular ROS: no chest pain or dyspnea on exertion  Musculoskeletal ROS: negative for joint pain or joint stiffness.  Negative for loss of strength.  Positive for foot pain.   Neuro ROS: Negative for syncope, numbness, or muscle weakness  Skin ROS: Negative for rash, itching or nail/hair changes.           OBJECTIVE:         There were no vitals filed for this visit.     Left Lower extremity exam:  Vasc:   Palpable pedal pulses.   Feet appropriately warm to touch.   Cap refill time is within normal limits   Edema: none    Neurological:    Light touch, proprioception, and Sharp/dull sensation are all intact.   There is no Tinel's along the tarsal tunnel.      Derm:    No open lesions, macerations, or rashes  Bruising:  absent  Redness:  absent  Pedal hair:  present      MSK:    Palpable pain plantar medial tubercle of the calcaneus left,    tightness to the Achilles tendon with ROM b/l  There is no pain with the heel squeeze/compression  test.

## 2022-05-09 ENCOUNTER — PATIENT MESSAGE (OUTPATIENT)
Dept: SMOKING CESSATION | Facility: CLINIC | Age: 67
End: 2022-05-09
Payer: COMMERCIAL

## 2022-05-16 ENCOUNTER — LAB VISIT (OUTPATIENT)
Dept: LAB | Facility: HOSPITAL | Age: 67
End: 2022-05-16
Attending: PHYSICIAN ASSISTANT
Payer: COMMERCIAL

## 2022-05-16 ENCOUNTER — OFFICE VISIT (OUTPATIENT)
Dept: FAMILY MEDICINE | Facility: CLINIC | Age: 67
End: 2022-05-16
Payer: COMMERCIAL

## 2022-05-16 VITALS
DIASTOLIC BLOOD PRESSURE: 80 MMHG | SYSTOLIC BLOOD PRESSURE: 138 MMHG | HEART RATE: 81 BPM | HEIGHT: 67 IN | OXYGEN SATURATION: 98 % | BODY MASS INDEX: 35.54 KG/M2 | WEIGHT: 226.44 LBS

## 2022-05-16 DIAGNOSIS — E66.01 SEVERE OBESITY (BMI 35.0-39.9) WITH COMORBIDITY: ICD-10-CM

## 2022-05-16 DIAGNOSIS — R56.9 CONVULSIONS, UNSPECIFIED CONVULSION TYPE: ICD-10-CM

## 2022-05-16 DIAGNOSIS — Z00.00 PREVENTATIVE HEALTH CARE: ICD-10-CM

## 2022-05-16 DIAGNOSIS — K14.8 LESION OF TONGUE: ICD-10-CM

## 2022-05-16 DIAGNOSIS — R56.9 SEIZURE-LIKE ACTIVITY: Primary | ICD-10-CM

## 2022-05-16 DIAGNOSIS — I10 ESSENTIAL HYPERTENSION: ICD-10-CM

## 2022-05-16 DIAGNOSIS — Z12.11 COLON CANCER SCREENING: ICD-10-CM

## 2022-05-16 LAB
BASOPHILS # BLD AUTO: 0.06 K/UL (ref 0–0.2)
BASOPHILS NFR BLD: 0.8 % (ref 0–1.9)
DIFFERENTIAL METHOD: ABNORMAL
EOSINOPHIL # BLD AUTO: 0.2 K/UL (ref 0–0.5)
EOSINOPHIL NFR BLD: 2.3 % (ref 0–8)
ERYTHROCYTE [DISTWIDTH] IN BLOOD BY AUTOMATED COUNT: 13.7 % (ref 11.5–14.5)
HCT VFR BLD AUTO: 42.4 % (ref 37–48.5)
HGB BLD-MCNC: 13.8 G/DL (ref 12–16)
IMM GRANULOCYTES # BLD AUTO: 0.04 K/UL (ref 0–0.04)
IMM GRANULOCYTES NFR BLD AUTO: 0.5 % (ref 0–0.5)
LYMPHOCYTES # BLD AUTO: 1.7 K/UL (ref 1–4.8)
LYMPHOCYTES NFR BLD: 22.2 % (ref 18–48)
MCH RBC QN AUTO: 31.9 PG (ref 27–31)
MCHC RBC AUTO-ENTMCNC: 32.5 G/DL (ref 32–36)
MCV RBC AUTO: 98 FL (ref 82–98)
MONOCYTES # BLD AUTO: 0.5 K/UL (ref 0.3–1)
MONOCYTES NFR BLD: 6.5 % (ref 4–15)
NEUTROPHILS # BLD AUTO: 5.3 K/UL (ref 1.8–7.7)
NEUTROPHILS NFR BLD: 67.7 % (ref 38–73)
NRBC BLD-RTO: 0 /100 WBC
PLATELET # BLD AUTO: 244 K/UL (ref 150–450)
PMV BLD AUTO: 10.7 FL (ref 9.2–12.9)
RBC # BLD AUTO: 4.32 M/UL (ref 4–5.4)
WBC # BLD AUTO: 7.8 K/UL (ref 3.9–12.7)

## 2022-05-16 PROCEDURE — 80053 COMPREHEN METABOLIC PANEL: CPT | Performed by: PHYSICIAN ASSISTANT

## 2022-05-16 PROCEDURE — 3008F BODY MASS INDEX DOCD: CPT | Mod: CPTII,S$GLB,, | Performed by: PHYSICIAN ASSISTANT

## 2022-05-16 PROCEDURE — 3075F PR MOST RECENT SYSTOLIC BLOOD PRESS GE 130-139MM HG: ICD-10-PCS | Mod: CPTII,S$GLB,, | Performed by: PHYSICIAN ASSISTANT

## 2022-05-16 PROCEDURE — 3079F PR MOST RECENT DIASTOLIC BLOOD PRESSURE 80-89 MM HG: ICD-10-PCS | Mod: CPTII,S$GLB,, | Performed by: PHYSICIAN ASSISTANT

## 2022-05-16 PROCEDURE — 3079F DIAST BP 80-89 MM HG: CPT | Mod: CPTII,S$GLB,, | Performed by: PHYSICIAN ASSISTANT

## 2022-05-16 PROCEDURE — 99214 OFFICE O/P EST MOD 30 MIN: CPT | Mod: S$GLB,,, | Performed by: PHYSICIAN ASSISTANT

## 2022-05-16 PROCEDURE — 36415 COLL VENOUS BLD VENIPUNCTURE: CPT | Mod: PO | Performed by: PHYSICIAN ASSISTANT

## 2022-05-16 PROCEDURE — 99999 PR PBB SHADOW E&M-EST. PATIENT-LVL III: ICD-10-PCS | Mod: PBBFAC,,, | Performed by: PHYSICIAN ASSISTANT

## 2022-05-16 PROCEDURE — 99999 PR PBB SHADOW E&M-EST. PATIENT-LVL III: CPT | Mod: PBBFAC,,, | Performed by: PHYSICIAN ASSISTANT

## 2022-05-16 PROCEDURE — 99499 UNLISTED E&M SERVICE: CPT | Mod: S$GLB,,, | Performed by: PHYSICIAN ASSISTANT

## 2022-05-16 PROCEDURE — 3008F PR BODY MASS INDEX (BMI) DOCUMENTED: ICD-10-PCS | Mod: CPTII,S$GLB,, | Performed by: PHYSICIAN ASSISTANT

## 2022-05-16 PROCEDURE — 1159F PR MEDICATION LIST DOCUMENTED IN MEDICAL RECORD: ICD-10-PCS | Mod: CPTII,S$GLB,, | Performed by: PHYSICIAN ASSISTANT

## 2022-05-16 PROCEDURE — 82274 ASSAY TEST FOR BLOOD FECAL: CPT | Performed by: EMERGENCY MEDICINE

## 2022-05-16 PROCEDURE — 99499 RISK ADDL DX/OHS AUDIT: ICD-10-PCS | Mod: S$GLB,,, | Performed by: PHYSICIAN ASSISTANT

## 2022-05-16 PROCEDURE — 3075F SYST BP GE 130 - 139MM HG: CPT | Mod: CPTII,S$GLB,, | Performed by: PHYSICIAN ASSISTANT

## 2022-05-16 PROCEDURE — 99214 PR OFFICE/OUTPT VISIT, EST, LEVL IV, 30-39 MIN: ICD-10-PCS | Mod: S$GLB,,, | Performed by: PHYSICIAN ASSISTANT

## 2022-05-16 PROCEDURE — 1159F MED LIST DOCD IN RCRD: CPT | Mod: CPTII,S$GLB,, | Performed by: PHYSICIAN ASSISTANT

## 2022-05-16 PROCEDURE — 85025 COMPLETE CBC W/AUTO DIFF WBC: CPT | Performed by: PHYSICIAN ASSISTANT

## 2022-05-16 PROCEDURE — 80061 LIPID PANEL: CPT | Performed by: PHYSICIAN ASSISTANT

## 2022-05-16 PROCEDURE — 84443 ASSAY THYROID STIM HORMONE: CPT | Performed by: PHYSICIAN ASSISTANT

## 2022-05-16 RX ORDER — CEPHALEXIN 500 MG/1
500 CAPSULE ORAL EVERY 12 HOURS
Qty: 14 CAPSULE | Refills: 0 | Status: SHIPPED | OUTPATIENT
Start: 2022-05-16 | End: 2022-05-23

## 2022-05-16 RX ORDER — HYDROCHLOROTHIAZIDE 25 MG/1
25 TABLET ORAL DAILY
Qty: 90 TABLET | Refills: 3 | Status: SHIPPED | OUTPATIENT
Start: 2022-05-16 | End: 2022-07-18 | Stop reason: SDUPTHER

## 2022-05-16 NOTE — PROGRESS NOTES
"Subjective:      Patient ID: Leny Tucker is a 66 y.o. female.    Chief Complaint: wet the bed  (Bit right side of tongue and still isn't better /1 week ago ) and Medication Refill    HPI   Patient has PMH of psoriasis, HTN, and GERD.     Patient reports Friday morning she awakened after wetting the bed and having bit the right side of her tongue.  Blood was all over the bed.  She states she was super fatigued and confused in the morning.  No new medications.  No history of seizures.  Taken nothing for tongue pain.    Patient reports more stress than usual.  Has had an EEG for "dizziness spells" when she was a teenager, but nothing came of it.    Review of Systems   Constitutional: Positive for fatigue. Negative for appetite change, chills and fever.   HENT:        Bite of right side of tongue   Respiratory: Negative for shortness of breath.    Cardiovascular: Negative for chest pain.   Neurological: Negative for headaches.   Psychiatric/Behavioral: Negative for sleep disturbance.       Objective:   /80   Pulse 81   Ht 5' 7" (1.702 m)   Wt 102.7 kg (226 lb 6.6 oz)   SpO2 98%   BMI 35.46 kg/m²      Physical Exam  Vitals reviewed.   Constitutional:       General: She is not in acute distress.     Appearance: Normal appearance. She is well-developed and well-groomed.   HENT:      Head: Normocephalic and atraumatic.      Right Ear: Hearing and external ear normal.      Left Ear: Hearing and external ear normal.      Mouth/Throat:      Tongue: Lesions (right lateral laceration) present.   Eyes:      General: Lids are normal.      Conjunctiva/sclera: Conjunctivae normal.   Neck:      Trachea: Phonation normal.   Cardiovascular:      Rate and Rhythm: Normal rate and regular rhythm.      Heart sounds: Normal heart sounds. No murmur heard.    No friction rub. No gallop.   Pulmonary:      Effort: Pulmonary effort is normal. No respiratory distress.      Breath sounds: Normal breath sounds. No decreased breath " sounds, wheezing, rhonchi or rales.   Musculoskeletal:         General: Normal range of motion.      Cervical back: Normal range of motion.   Skin:     General: Skin is warm and dry.      Findings: No rash.   Neurological:      General: No focal deficit present.      Mental Status: She is alert and oriented to person, place, and time.      Comments: Neuro and strength exam normal and symmetrical.   Psychiatric:         Attention and Perception: Attention normal.         Mood and Affect: Mood normal.         Speech: Speech normal.         Behavior: Behavior normal. Behavior is cooperative.         Judgment: Judgment normal.        Assessment:      1. Seizure-like activity    2. Lesion of tongue    3. Essential hypertension    4. Colon cancer screening    5. Severe obesity (BMI 35.0-39.9) with comorbidity       Plan:   1. Seizure-like activity  Would appreciate neurology evaluation asap.  Recommended not to drive until she is evaluated by neurology.  - Ambulatory referral/consult to Neurology; Future    2. Lesion of tongue  - cephALEXin (KEFLEX) 500 MG capsule; Take 1 capsule (500 mg total) by mouth every 12 (twelve) hours. for 7 days  Dispense: 14 capsule; Refill: 0    3. Essential hypertension  Controlled with current medication.  - hydroCHLOROthiazide (HYDRODIURIL) 25 MG tablet; Take 1 tablet (25 mg total) by mouth once daily.  Dispense: 90 tablet; Refill: 3    4. Colon cancer screening  Gave fitkit and instructions to patient.  - Fecal Immunochemical Test (iFOBT)    5. Severe obesity (BMI 35.0-39.9) with comorbidity    Addendum 5/24/2022: Ordered as per Dr. Patrick, neurology, MRI brain without contrast and EEG to better evaluate patient in his visit.    Follow up as needed.  Patient agreed with plan and expressed understanding.    Thank you for allowing me to serve you,

## 2022-05-17 LAB
ALBUMIN SERPL BCP-MCNC: 3.9 G/DL (ref 3.5–5.2)
ALP SERPL-CCNC: 74 U/L (ref 55–135)
ALT SERPL W/O P-5'-P-CCNC: 22 U/L (ref 10–44)
ANION GAP SERPL CALC-SCNC: 10 MMOL/L (ref 8–16)
AST SERPL-CCNC: 21 U/L (ref 10–40)
BILIRUB SERPL-MCNC: 0.4 MG/DL (ref 0.1–1)
BUN SERPL-MCNC: 21 MG/DL (ref 8–23)
CALCIUM SERPL-MCNC: 9.2 MG/DL (ref 8.7–10.5)
CHLORIDE SERPL-SCNC: 107 MMOL/L (ref 95–110)
CHOLEST SERPL-MCNC: 189 MG/DL (ref 120–199)
CHOLEST/HDLC SERPL: 3 {RATIO} (ref 2–5)
CO2 SERPL-SCNC: 26 MMOL/L (ref 23–29)
CREAT SERPL-MCNC: 0.8 MG/DL (ref 0.5–1.4)
EST. GFR  (AFRICAN AMERICAN): >60 ML/MIN/1.73 M^2
EST. GFR  (NON AFRICAN AMERICAN): >60 ML/MIN/1.73 M^2
GLUCOSE SERPL-MCNC: 94 MG/DL (ref 70–110)
HDLC SERPL-MCNC: 64 MG/DL (ref 40–75)
HDLC SERPL: 33.9 % (ref 20–50)
LDLC SERPL CALC-MCNC: 105.8 MG/DL (ref 63–159)
NONHDLC SERPL-MCNC: 125 MG/DL
POTASSIUM SERPL-SCNC: 3.8 MMOL/L (ref 3.5–5.1)
PROT SERPL-MCNC: 6.8 G/DL (ref 6–8.4)
SODIUM SERPL-SCNC: 143 MMOL/L (ref 136–145)
TRIGL SERPL-MCNC: 96 MG/DL (ref 30–150)
TSH SERPL DL<=0.005 MIU/L-ACNC: 1.78 UIU/ML (ref 0.4–4)

## 2022-05-22 PROBLEM — E66.01 SEVERE OBESITY (BMI 35.0-39.9) WITH COMORBIDITY: Status: ACTIVE | Noted: 2022-05-22

## 2022-05-26 ENCOUNTER — TELEPHONE (OUTPATIENT)
Dept: NEUROLOGY | Facility: CLINIC | Age: 67
End: 2022-05-26
Payer: COMMERCIAL

## 2022-05-26 NOTE — TELEPHONE ENCOUNTER
STAT MRI scheduled for 5/27/22 @ 2pm. Spoke to pt's , Rafael and provided him with appt date/time and locations. Also gave him the number to [facility] to schedule the EEG. Advised him it needed to be done prior to 6/9/22. He v/u.

## 2022-05-27 ENCOUNTER — HOSPITAL ENCOUNTER (OUTPATIENT)
Dept: RADIOLOGY | Facility: HOSPITAL | Age: 67
Discharge: HOME | End: 2022-05-27
Attending: PHYSICIAN ASSISTANT
Payer: COMMERCIAL

## 2022-05-27 DIAGNOSIS — R56.9 SEIZURE-LIKE ACTIVITY: ICD-10-CM

## 2022-05-27 PROCEDURE — 70551 MRI BRAIN STEM W/O DYE: CPT | Mod: TC,PO

## 2022-05-27 PROCEDURE — 70551 MRI BRAIN WITHOUT CONTRAST: ICD-10-PCS | Mod: 26,,, | Performed by: RADIOLOGY

## 2022-05-27 PROCEDURE — 70551 MRI BRAIN STEM W/O DYE: CPT | Mod: 26,,, | Performed by: RADIOLOGY

## 2022-05-30 ENCOUNTER — TELEPHONE (OUTPATIENT)
Dept: NEUROLOGY | Facility: CLINIC | Age: 67
End: 2022-05-30
Payer: COMMERCIAL

## 2022-05-30 NOTE — TELEPHONE ENCOUNTER
Spoke with patient and advised Dr. Patrick's next new patient appointment is 7/26/22. Advised patient that she can call [place] scheduling at [phone] to see if she can get the EEG rescheduled to be done before her 6/9/22 appointment with Dr. Patrick. Patient voiced understanding.

## 2022-05-30 NOTE — TELEPHONE ENCOUNTER
----- Message from Dony Agarwal sent at 5/30/2022 10:18 AM CDT -----  Regarding: sooner appt  Type:  Sooner Appointment Request    Caller is requesting a sooner appointment.      Name of Caller:  Leny    When is the first available appointment?  None through end of cal    Symptoms:  SZ like activity    Best Call Back Number:  [phone]    Additional Information:  pt needs to reschedule to date after EEG. PT has CV19 and had to reschedule EEG.

## 2022-06-02 LAB — HEMOCCULT STL QL IA: NEGATIVE

## 2022-06-07 ENCOUNTER — HOSPITAL ENCOUNTER (OUTPATIENT)
Dept: NEUROLOGY | Facility: HOSPITAL | Age: 67
Discharge: HOME | End: 2022-06-07
Attending: PHYSICIAN ASSISTANT
Payer: COMMERCIAL

## 2022-06-07 DIAGNOSIS — R56.9 SEIZURE-LIKE ACTIVITY: ICD-10-CM

## 2022-06-07 PROCEDURE — 95819 EEG AWAKE AND ASLEEP: CPT

## 2022-06-07 PROCEDURE — 95819 PR EEG,W/AWAKE & ASLEEP RECORD: ICD-10-PCS | Mod: 26,,, | Performed by: PSYCHIATRY & NEUROLOGY

## 2022-06-07 PROCEDURE — 95819 EEG AWAKE AND ASLEEP: CPT | Mod: 26,,, | Performed by: PSYCHIATRY & NEUROLOGY

## 2022-06-08 NOTE — PROCEDURES
Routine EEG Report    Leny Tucker  [id number]  1955    DATE OF SERVICE:  06/07/2022  REASON FOR CONSULT:  66-year-old woman with a nocturnal convulsion with tongue bite and urinary incontinence.  Evaluate for evidence of epileptiform activity.    METHODOLOGY   Electroencephalographic (EEG) recording is with electrodes placed according to the International 10-20 placement system.  Thirty two (32) channels of digital signal (sampling rate of 512/sec) including T1 and T2 was simultaneously recorded from the scalp and may include  EKG, EMG, and/or eye monitors.  Recording band pass was 0.1 to 512 hz.  Digital video recording of the patient is simultaneously recorded with the EEG.  The patient is instructed report clinical symptoms which may occur during the recording session.  EEG and video recording is stored and archived in digital format. Activation procedures which include photic stimulation, hyperventilation and instructing patients to perform simple task are done in selected patients.    The EEG is displayed on a monitor screen and can be reviewed using different montages.  Computer assisted analysis is employed to detect spike and electrographic seizure activity.   The entire record is submitted for computer analysis.  The entire recording is visually reviewed and the times identified by computer analysis as being spikes or seizures are reviewed again.  Compresses spectral analysis (CSA) is also performed on the activity recorded from each individual channel.  This is displayed as a power display of frequencies from 0 to 30 Hz over time.   The CSA is reviewed looking for asymmetries in power between homologous areas of the scalp and then compared with the original EEG recording.     mohchi software is also utilized in the review of this study.  This software suite analyzes the EEG recording in multiple domains.  Coherence and rhythmicity is computed to identify EEG sections which may contain organized  seizures.  Each channel undergoes analysis to detect presence of spike and sharp waves which have special and morphological characteristic of epileptic activity.  The routine EEG recording is converted from spacial into frequency domain.  This is then displayed comparing homologous areas to identify areas of significant asymmetry.  Algorithm to identify non-cortically generated artifact is used to separate eye movement, EMG and other artifact from the EEG.      EEG FINDINGS  Background activity:   The waking background is continuous and symmetric with a well-formed 9 hz posterior dominant rhythm seen bilaterally.    Sleep:  The patient transitions from wakefulness to sleep with the appearance of sleep spindles, K complexes, and vertex waves.    Activation procedures:   The patient is able to follow simple commands and answers orientation questions correctly. Photic stimulation is performed with no activation of the record.  Hyperventilation is performed with good effort with no activation of the record.     Cardiac Monitor:   Heart rate appears generally regular on a single lead EKG.    Impression:   This is a normal awake and asleep routine EEG.  There are no prominent focal findings, no epileptiform discharges, and no electrographic seizures.   Of note, a normal EEG does not rule out the diagnosis of epilepsy.  Clinical correlation is advised.    Joanie Nunez MD PhD  Neurology-Epilepsy  [facility].

## 2022-06-09 ENCOUNTER — OFFICE VISIT (OUTPATIENT)
Dept: NEUROLOGY | Facility: CLINIC | Age: 67
End: 2022-06-09
Payer: COMMERCIAL

## 2022-06-09 VITALS
RESPIRATION RATE: 18 BRPM | BODY MASS INDEX: 35.67 KG/M2 | WEIGHT: 227.75 LBS | HEART RATE: 92 BPM | DIASTOLIC BLOOD PRESSURE: 77 MMHG | SYSTOLIC BLOOD PRESSURE: 138 MMHG

## 2022-06-09 DIAGNOSIS — R56.9 SEIZURE-LIKE ACTIVITY: ICD-10-CM

## 2022-06-09 PROCEDURE — 99999 PR PBB SHADOW E&M-EST. PATIENT-LVL IV: ICD-10-PCS | Mod: PBBFAC,,, | Performed by: PSYCHIATRY & NEUROLOGY

## 2022-06-09 PROCEDURE — 3078F PR MOST RECENT DIASTOLIC BLOOD PRESSURE < 80 MM HG: ICD-10-PCS | Mod: CPTII,S$GLB,, | Performed by: PSYCHIATRY & NEUROLOGY

## 2022-06-09 PROCEDURE — 3008F PR BODY MASS INDEX (BMI) DOCUMENTED: ICD-10-PCS | Mod: CPTII,S$GLB,, | Performed by: PSYCHIATRY & NEUROLOGY

## 2022-06-09 PROCEDURE — 3075F SYST BP GE 130 - 139MM HG: CPT | Mod: CPTII,S$GLB,, | Performed by: PSYCHIATRY & NEUROLOGY

## 2022-06-09 PROCEDURE — 3075F PR MOST RECENT SYSTOLIC BLOOD PRESS GE 130-139MM HG: ICD-10-PCS | Mod: CPTII,S$GLB,, | Performed by: PSYCHIATRY & NEUROLOGY

## 2022-06-09 PROCEDURE — 99204 OFFICE O/P NEW MOD 45 MIN: CPT | Mod: S$GLB,,, | Performed by: PSYCHIATRY & NEUROLOGY

## 2022-06-09 PROCEDURE — 99999 PR PBB SHADOW E&M-EST. PATIENT-LVL IV: CPT | Mod: PBBFAC,,, | Performed by: PSYCHIATRY & NEUROLOGY

## 2022-06-09 PROCEDURE — 1159F MED LIST DOCD IN RCRD: CPT | Mod: CPTII,S$GLB,, | Performed by: PSYCHIATRY & NEUROLOGY

## 2022-06-09 PROCEDURE — 99204 PR OFFICE/OUTPT VISIT, NEW, LEVL IV, 45-59 MIN: ICD-10-PCS | Mod: S$GLB,,, | Performed by: PSYCHIATRY & NEUROLOGY

## 2022-06-09 PROCEDURE — 1159F PR MEDICATION LIST DOCUMENTED IN MEDICAL RECORD: ICD-10-PCS | Mod: CPTII,S$GLB,, | Performed by: PSYCHIATRY & NEUROLOGY

## 2022-06-09 PROCEDURE — 3008F BODY MASS INDEX DOCD: CPT | Mod: CPTII,S$GLB,, | Performed by: PSYCHIATRY & NEUROLOGY

## 2022-06-09 PROCEDURE — 3078F DIAST BP <80 MM HG: CPT | Mod: CPTII,S$GLB,, | Performed by: PSYCHIATRY & NEUROLOGY

## 2022-06-09 NOTE — PROGRESS NOTES
Date of service:  06/09/2022     Chief complaint:  Seizures    History of present illness:  The patient is a 66 y.o. female we have been asked to see for evaluation of an episode suspicious for seizure. This occurred about a month ago.  The patient had no warning as this event occurred out of sleep.  There was no witness to the event.  Her  had gotten up around 3:30 or 4 AM.  The patient woke up at about 7:30.  She found herself covered in urine, and she had bitten her tongue.  The urine was cold, and the blood had clotted.  She felt out of sorts for about an hour or so after awakening.  The patient normally does not have issues waking up in the morning, so this feeling was unusual for her.  She reports no similar events previously.  She also denies periods of missing time, unexplained injuries, or involuntary movements.  She has no family history of seizures.      Her  does endorse her snoring.  She reports feeling well rested when she wakes up.  She denies daytime somnolence.    She was diagnosed with COVID 2 weeks ago.  This about 2 weeks after the event.  She was not feeling ill at the time of the event.    Potential Epilepsy Risk Factors:   Pregnancy/Labor/Delivery - none  Febrile seizures - none  Head injury  - none  CNS infection - none     Stroke - none  Family Hx of Sz - none    AED Treatments  Present regimen  None     Prior treatments  Phenobarbital (as a teenager for spells of dizziness of uncertain etiology, stopped due to sedation, dizzy spells resolved decades ago)    Not tried  acetazolamide (Diamox, AZM)  Amantadine  brivitiracetam (Briviact, BRV)  cenobamate (Xcopri)  carbamazepine (Tegretol, CBZ)  clobazam (Onfi or Frizium, CLB)  ethosuximide (Zarontin, ESM)  eslicarbazine (Aptiom, ESL)  felbamate (felbatol, FBM)  gabapentin (Neurontin, GPN)  lacosamide (Vimpat, LCS)   lamotrigine (Lamictal, LTG)   levetiracetam (Keppra, LEV)  methsuximide (Celontin, MSM)  oxcarbazepine (Trileptal  OXC)  perampanel (Fycompa, FCP)   phenytoin (Dilantin, PHT)  pregabalin (Lyrica, PGB)  primidone (Mysoline, PRM)  rufinamide (Banzel, RUF)  tiagabine (Gabatril,  TGB)  topiramate (Topamax, TPM)  viagabatrin, (Sabril, VGB)  vagal nerve stimulator (VNS)  valproic acid (Depakote, VPA)  zonisamide (Zonegran, ZNA)  Benzodiazepines  diazepam - rectal (Diastatl)  diazepam - oral (Valium, DZ)  clonazepam (Klonopin, CZP)  clorazepate (Tranxene, CLZ)  Ativan  Brain Stimulation  Vagal Nerve Stimulation-n/a  DBS- n/a    Past Medical History:   Diagnosis Date    Allergy to pollen     GERD (gastroesophageal reflux disease)        Past Surgical History:   Procedure Laterality Date    BREAST BIOPSY Bilateral     neg    FINGER SURGERY      bone lesion    HYSTERECTOMY      LIPOMA RESECTION      biopsy negative       Family History   Problem Relation Age of Onset    Hypertension Brother     Hypertension Brother     Heart disease Father     Rheumatic fever Mother     Arthritis Mother         rheumatoid arthritis    Heart disease Mother        Social History     Socioeconomic History    Marital status:     Number of children: 2   Occupational History    Occupation: RN     Employer: [employer]     Comment: [employer]      Employer: [employer]   Tobacco Use    Smoking status: Former Smoker     Packs/day: 1.50     Years: 10.00     Pack years: 15.00     Quit date: 1993     Years since quittin.2    Smokeless tobacco: Never Used   Substance and Sexual Activity    Alcohol use: No     Alcohol/week: 0.0 standard drinks    Drug use: No    Sexual activity: Not Currently     Partners: Male   Social History Narrative    Walking for exercise.  Three times a week.  15 minutes.        Review of patient's allergies indicates:   Allergen Reactions    Doxycycline     Augmentin [amoxicillin-pot clavulanate] Rash    Sulfa (sulfonamide antibiotics) Rash     "    Review of Systems  The patient's ROS is noncontributory except as noted above.     Physical exam:  /77 (BP Location: Left arm, Patient Position: Sitting, BP Method: X-Large (Automatic))   Pulse 92   Resp 18   Wt 103.3 kg (227 lb 11.8 oz)   BMI 35.67 kg/m²   General: Well developed, well nourished.  No acute distress.  ENT: Mucus membranes moist.  Atraumatic external nose and ears.  Lymphatic: No apparent lymphadenopathy.  Cardiovascular: Regular rate and rhythm.  Pulmonary: No increased work of breathing.  Abdomen/GI: No guarding.  Musculoskeletal: No clubbing or cyanosis.    Neurological exam:  Mental status: Awake and alert.  Oriented x4.  Speech fluent and appropriate.  Recent and remote memory appear to be intact.  Fund of knowledge normal.  Cranial nerves: Pupils equal round and reactive to light, extraocular movements intact, facial strength and sensation intact bilaterally, palate and tongue midline, hearing grossly intact bilaterally.  Motor: 5 out of 5 strength throughout the upper and lower extremities bilaterally. Normal bulk and tone.  Sensation: Intact to light touch and temperature bilaterally.  DTR: 2+ at the knees and biceps bilaterally.  Coordination: Finger-nose-finger testing intact bilaterally.  Gait: Normal gait. Good tandem.    Data base:  Notes from the referring provider were reviewed.  Briefly summarized, these discuss the patient's episode in a manner consistent with that above.    Labs:  CMP: unremarkable  CBC: unremarkable    MRI brain:  "There is no acute abnormality.  There is no intracranial hemorrhage, mass/mass effect, acute edema or ischemia."  I independently visualized the images of this study and interpreted them.  No acute intracranial process was appreciated.     EEG:  "This is a normal awake and asleep routine EEG.  There are no prominent focal findings, no epileptiform discharges, and no electrographic seizures.   Of note, a normal EEG does not rule out the " "diagnosis of epilepsy.  Clinical correlation is advised."    Assessment and plan:  The patient is a 66 y.o. female we have been asked to see for evaluation of an event, which while unwitnessed, is quite worrisome for a seizure.  It is unclear that she has epilepsy, rather than this being a 1 time event.  We will hold off on starting any AEDs in light of her workup.  State law as it pertains to driving for individuals with seizures was discussed.  She should contact us once she is seizure free for 6 months.  The patient was also counseled on seizure safety. She is to call us if she has a second seizure, at which point we would likely start an AED and follow her in clinic.  Otherwise, we will plan on seeing the patient back on a prn basis.  "

## 2022-06-21 ENCOUNTER — OFFICE VISIT (OUTPATIENT)
Dept: PODIATRY | Facility: CLINIC | Age: 67
End: 2022-06-21
Payer: COMMERCIAL

## 2022-06-21 DIAGNOSIS — M72.2 PLANTAR FASCIITIS: Primary | ICD-10-CM

## 2022-06-21 DIAGNOSIS — M21.6X2 ACQUIRED EQUINUS DEFORMITY OF BOTH FEET: ICD-10-CM

## 2022-06-21 DIAGNOSIS — M21.6X1 ACQUIRED EQUINUS DEFORMITY OF BOTH FEET: ICD-10-CM

## 2022-06-21 DIAGNOSIS — M79.672 LEFT FOOT PAIN: ICD-10-CM

## 2022-06-21 PROCEDURE — 99213 PR OFFICE/OUTPT VISIT, EST, LEVL III, 20-29 MIN: ICD-10-PCS | Mod: 25,S$GLB,, | Performed by: STUDENT IN AN ORGANIZED HEALTH CARE EDUCATION/TRAINING PROGRAM

## 2022-06-21 PROCEDURE — 1160F PR REVIEW ALL MEDS BY PRESCRIBER/CLIN PHARMACIST DOCUMENTED: ICD-10-PCS | Mod: CPTII,S$GLB,, | Performed by: STUDENT IN AN ORGANIZED HEALTH CARE EDUCATION/TRAINING PROGRAM

## 2022-06-21 PROCEDURE — 1159F MED LIST DOCD IN RCRD: CPT | Mod: CPTII,S$GLB,, | Performed by: STUDENT IN AN ORGANIZED HEALTH CARE EDUCATION/TRAINING PROGRAM

## 2022-06-21 PROCEDURE — 99213 OFFICE O/P EST LOW 20 MIN: CPT | Mod: 25,S$GLB,, | Performed by: STUDENT IN AN ORGANIZED HEALTH CARE EDUCATION/TRAINING PROGRAM

## 2022-06-21 PROCEDURE — 1160F RVW MEDS BY RX/DR IN RCRD: CPT | Mod: CPTII,S$GLB,, | Performed by: STUDENT IN AN ORGANIZED HEALTH CARE EDUCATION/TRAINING PROGRAM

## 2022-06-21 PROCEDURE — 20550 PR INJECT TENDON SHEATH/LIGAMENT: ICD-10-PCS | Mod: LT,S$GLB,, | Performed by: STUDENT IN AN ORGANIZED HEALTH CARE EDUCATION/TRAINING PROGRAM

## 2022-06-21 PROCEDURE — 20550 NJX 1 TENDON SHEATH/LIGAMENT: CPT | Mod: LT,S$GLB,, | Performed by: STUDENT IN AN ORGANIZED HEALTH CARE EDUCATION/TRAINING PROGRAM

## 2022-06-21 PROCEDURE — 1159F PR MEDICATION LIST DOCUMENTED IN MEDICAL RECORD: ICD-10-PCS | Mod: CPTII,S$GLB,, | Performed by: STUDENT IN AN ORGANIZED HEALTH CARE EDUCATION/TRAINING PROGRAM

## 2022-06-21 PROCEDURE — 99999 PR PBB SHADOW E&M-EST. PATIENT-LVL II: CPT | Mod: PBBFAC,,, | Performed by: STUDENT IN AN ORGANIZED HEALTH CARE EDUCATION/TRAINING PROGRAM

## 2022-06-21 PROCEDURE — 99999 PR PBB SHADOW E&M-EST. PATIENT-LVL II: ICD-10-PCS | Mod: PBBFAC,,, | Performed by: STUDENT IN AN ORGANIZED HEALTH CARE EDUCATION/TRAINING PROGRAM

## 2022-06-21 RX ORDER — TRIAMCINOLONE ACETONIDE 40 MG/ML
40 INJECTION, SUSPENSION INTRA-ARTICULAR; INTRAMUSCULAR
Status: COMPLETED | OUTPATIENT
Start: 2022-06-21 | End: 2022-06-21

## 2022-06-21 RX ADMIN — TRIAMCINOLONE ACETONIDE 40 MG: 40 INJECTION, SUSPENSION INTRA-ARTICULAR; INTRAMUSCULAR at 01:06

## 2022-06-21 NOTE — PROGRESS NOTES
Chief Complaint   Patient presents with    Foot Pain    Follow-up         MEDICAL DECISION MAKING           Problem List Items Addressed This Visit    None           I counseled the patient on the patient's conditions, their implications and medical management.     Plantar Fasciitis:  -Patient was given a printout of stretching exercises to stretch the achilles tendon and increase ankle dorsiflexion  -Patient instructed to ice with a frozen water bottle as instructed in the handout.  -Patient instructed to refrain from barefoot walking. I recommend Hoka slippers.  -Shoe gear was discussed with patient and recommended a supportive shoe with a stiff shank that doesn't flex at the arch. The shoe should also have an elevated heel. Some brands include Huizar, Asics and Hokas. The length of the shoe should accommodate the width of a thumb between the big toe and the edge of the shoe.    Patient was prescribed a medrol dose pack to help with inflammation.  Patient to continue Mobic to decrease the inflammatory process.    6/21/22: Steroid injections given today. F/u 6-8 weeks    Tendon Sheath    Date/Time: 6/21/2022 1:00 PM  Performed by: Issa Gupta DPM  Authorized by: Issa Gupta DPM     Consent Done?:  Yes (Verbal)  Indications:  Pain  Location:  Foot  Foot joint: left heel.  Needle size:  25 G  Approach:  Plantar  Patient tolerance:  Patient tolerated the procedure well with no immediate complications          Patient to follow up in about 6 weeks.       HPI:       Leny Tucker is a 66 y.o. female who presents to clinic with concerns of left heel pain for a couple of weeks.     Pain is worse with weight bearing and first few steps after prolonged periods of rest.     Patient denies acute trauma to the affected area.   Treatment tried: none  Patient works as a floor nurse for [employer]      Patient Active Problem List   Diagnosis    Rhinitis    GERD (gastroesophageal reflux disease)    Psoriasis     Essential hypertension    Non morbid obesity due to excess calories    Severe obesity (BMI 35.0-39.9) with comorbidity    Seizure-like activity         Current Outpatient Medications on File Prior to Visit   Medication Sig Dispense Refill    cetirizine (ZYRTEC) 10 MG tablet Take 10 mg by mouth once daily.      fluticasone propionate (FLONASE) 50 mcg/actuation nasal spray SHAKE LIQUID AND USE 2 SPRAYS(100 MCG) IN EACH NOSTRIL TWICE DAILY 96 g 3    hydroCHLOROthiazide (HYDRODIURIL) 25 MG tablet Take 1 tablet (25 mg total) by mouth once daily. 90 tablet 3    meloxicam (MOBIC) 7.5 MG tablet TAKE 1 TABLET BY MOUTH DAILY WITH FOOD AS NEEDED FOR PAIN 90 tablet 3    multivitamin capsule Take 1 capsule by mouth once daily.      diclofenac sodium (VOLTAREN) 1 % Gel Apply 2 g topically 4 (four) times daily. 100 g 2     No current facility-administered medications on file prior to visit.           Review of patient's allergies indicates:   Allergen Reactions    Doxycycline     Augmentin [amoxicillin-pot clavulanate] Rash    Sulfa (sulfonamide antibiotics) Rash         ROS:  General ROS: negative for  chills, fatigue or fever  Cardiovascular ROS: no chest pain or dyspnea on exertion  Musculoskeletal ROS: negative for joint pain or joint stiffness.  Negative for loss of strength.  Positive for foot pain.   Neuro ROS: Negative for syncope, numbness, or muscle weakness  Skin ROS: Negative for rash, itching or nail/hair changes.           OBJECTIVE:         There were no vitals filed for this visit.     Left Lower extremity exam:  Vasc:   Palpable pedal pulses.   Feet appropriately warm to touch.   Cap refill time is within normal limits   Edema: none    Neurological:    Light touch, proprioception, and Sharp/dull sensation are all intact.   There is no Tinel's along the tarsal tunnel.      Derm:   No open lesions, macerations, or rashes  Bruising:  absent  Redness:  absent  Pedal hair:  present      MSK:    Palpable  pain plantar medial tubercle of the calcaneus left,    tightness to the Achilles tendon with ROM b/l  There is no pain with the heel squeeze/compression  test.

## 2022-07-18 DIAGNOSIS — I10 ESSENTIAL HYPERTENSION: ICD-10-CM

## 2022-07-18 RX ORDER — HYDROCHLOROTHIAZIDE 25 MG/1
25 TABLET ORAL DAILY
Qty: 90 TABLET | Refills: 3 | Status: SHIPPED | OUTPATIENT
Start: 2022-07-18 | End: 2022-07-21 | Stop reason: SDUPTHER

## 2022-07-18 NOTE — TELEPHONE ENCOUNTER
----- Message from Deborah Bergeron sent at 7/18/2022  9:43 AM CDT -----  Regarding: refill  Can the clinic reply in [facility]: NO      Please refill the medication(s) listed below. Please call the patient when the prescription(s) is ready for  at this phone number   VARINDER ARGUELLO [[id number]] [phone]      Medication #1hydroCHLOROthiazide (HYDRODIURIL) 25 MG tablet ( 90 day supply)          Preferred Pharmacy: [facility] at [address] [place] [id number]

## 2022-07-18 NOTE — TELEPHONE ENCOUNTER
No new care gaps identified.  [facility] Embedded Care Gaps. Reference number: [id number]. 7/18/2022   12:17:41 PM CDT

## 2022-07-21 DIAGNOSIS — I10 ESSENTIAL HYPERTENSION: ICD-10-CM

## 2022-07-21 RX ORDER — HYDROCHLOROTHIAZIDE 25 MG/1
25 TABLET ORAL DAILY
Qty: 90 TABLET | Refills: 3 | Status: SHIPPED | OUTPATIENT
Start: 2022-07-21 | End: 2023-07-07 | Stop reason: SDUPTHER

## 2022-07-21 NOTE — TELEPHONE ENCOUNTER
----- Message from Homero Mendez sent at 7/21/2022  8:29 AM CDT -----  Type:  RX Refill Request  Who Called: Pt   Refill or New Rx: refill  RX Name and Strength: hydroCHLOROthiazide (HYDRODIURIL) 25 MG tablet 90 tablet   How is the patient currently taking it? (ex. 1XDay):    Is this a 30 day or 90 day RX:    Preferred Pharmacy with phone number:  [facility]/pharm[facility] #[id number] - [place] - [address]  Phone:  [phone]  Fax:  [phone]  Local or Mail Order:  Local  Ordering Provider:  Criss Carreon PA-C  Best Call Back Number:  [phone]  Additional Information: Pt requesting refill on Rx hydroCHLOROthiazide (HYDRODIURIL) 25 MG tablet 90 tablet , needs to be called into pharmacy listed above instead.

## 2022-07-21 NOTE — TELEPHONE ENCOUNTER
No new care gaps identified.  [facility] Embedded Care Gaps. Reference number: [id number]. 7/21/2022   10:38:53 AM SAGART

## 2022-07-29 ENCOUNTER — TELEPHONE (OUTPATIENT)
Dept: NEUROLOGY | Facility: CLINIC | Age: 67
End: 2022-07-29
Payer: COMMERCIAL

## 2022-07-29 ENCOUNTER — PATIENT MESSAGE (OUTPATIENT)
Dept: NEUROLOGY | Facility: CLINIC | Age: 67
End: 2022-07-29
Payer: COMMERCIAL

## 2022-07-29 DIAGNOSIS — R56.9 SEIZURE-LIKE ACTIVITY: Primary | ICD-10-CM

## 2022-07-29 RX ORDER — LEVETIRACETAM 750 MG/1
TABLET ORAL
Qty: 60 TABLET | Refills: 6 | Status: SHIPPED | OUTPATIENT
Start: 2022-07-29 | End: 2023-03-06 | Stop reason: SDUPTHER

## 2022-07-29 NOTE — TELEPHONE ENCOUNTER
Advised pt that Keppra 750 mg was called in to her local pharmacy. I advised the pt on proper administration and to contact our office if she has any side effects from th medication (drowsiness, irritability). Pt v/u

## 2022-07-29 NOTE — TELEPHONE ENCOUNTER
----- Message from Rozina Garrett sent at 7/29/2022 12:29 PM CDT -----  Patient Call Back    Who Called: PT     What is the reqeust in detail: pt calling to speak with someone regarding a message that was sent this morning. The pt stated that she had a seizure last night and she bit her tongue and wet the bed. The pt would like to know if medication can be called into her pharmacy. Pls advise.      Can the clinic reply by [facility]?    Best Call Back Number: [phone]

## 2022-07-29 NOTE — TELEPHONE ENCOUNTER
Pt repots that she had a seizure during the night/early morning. She states that she was asleep during the episode but she did bite her tongue and wet the bed. She is unsure of of how long the episode lasted. She wanted to see if she can get a medication for the bite--she reports that last time this happened someone prescribed her a medication for it. Also if she needs to start on an AED? Please advise.

## 2022-07-29 NOTE — TELEPHONE ENCOUNTER
----- Message from Lalo Gandhi sent at 7/29/2022  9:33 AM CDT -----  Contact: self  Pt called stating she had another siezure last night and would like a call back from the nurse. Please reach at [phone] and advise.      [id number] - [place] [address] AT [address] & [address] [address]  Phone: [phone] Fax: [phone]

## 2022-09-12 ENCOUNTER — TELEPHONE (OUTPATIENT)
Dept: NEUROLOGY | Facility: CLINIC | Age: 67
End: 2022-09-12
Payer: COMMERCIAL

## 2022-09-12 NOTE — TELEPHONE ENCOUNTER
----- Message from Shanita Ching sent at 9/12/2022 11:13 AM CDT -----  Type: Needs Medical Advice  Who Called:  pt  Symptoms (please be specific):  pt said she need to speak to the nurse-- she wanted to know when will she be able to start back driving--please call and advise   Best Call Back Number: [phone] (home)     Additional Information: thank you

## 2022-09-12 NOTE — TELEPHONE ENCOUNTER
Spoke with patient and advised per state law, she cannot drive for 6 months since last seizure. Patients last seizure was July 28th. Advised will be 6 months from then. Patient voiced understanding.

## 2022-10-12 DIAGNOSIS — Z78.0 MENOPAUSE: ICD-10-CM

## 2023-01-03 ENCOUNTER — PATIENT MESSAGE (OUTPATIENT)
Dept: FAMILY MEDICINE | Facility: CLINIC | Age: 68
End: 2023-01-03
Payer: COMMERCIAL

## 2023-01-05 DIAGNOSIS — Z12.31 OTHER SCREENING MAMMOGRAM: ICD-10-CM

## 2023-01-09 ENCOUNTER — PATIENT MESSAGE (OUTPATIENT)
Dept: ADMINISTRATIVE | Facility: HOSPITAL | Age: 68
End: 2023-01-09
Payer: COMMERCIAL

## 2023-01-23 ENCOUNTER — HOSPITAL ENCOUNTER (OUTPATIENT)
Dept: RADIOLOGY | Facility: HOSPITAL | Age: 68
Discharge: HOME | End: 2023-01-23
Attending: EMERGENCY MEDICINE
Payer: MEDICARE

## 2023-01-23 DIAGNOSIS — Z78.0 MENOPAUSE: ICD-10-CM

## 2023-01-23 PROCEDURE — 77080 DEXA BONE DENSITY SPINE HIP: ICD-10-PCS | Mod: 26,HCNC,, | Performed by: RADIOLOGY

## 2023-01-23 PROCEDURE — 77080 DXA BONE DENSITY AXIAL: CPT | Mod: 26,HCNC,, | Performed by: RADIOLOGY

## 2023-01-23 PROCEDURE — 77080 DXA BONE DENSITY AXIAL: CPT | Mod: TC,HCNC,PO

## 2023-02-09 DIAGNOSIS — Z00.00 ENCOUNTER FOR MEDICARE ANNUAL WELLNESS EXAM: ICD-10-CM

## 2023-02-16 ENCOUNTER — HOSPITAL ENCOUNTER (OUTPATIENT)
Dept: RADIOLOGY | Facility: HOSPITAL | Age: 68
Discharge: HOME | End: 2023-02-16
Attending: INTERNAL MEDICINE
Payer: MEDICARE

## 2023-02-16 DIAGNOSIS — Z12.31 ENCOUNTER FOR SCREENING MAMMOGRAM FOR BREAST CANCER: ICD-10-CM

## 2023-02-16 PROCEDURE — 77063 BREAST TOMOSYNTHESIS BI: CPT | Mod: 26,HCNC,, | Performed by: RADIOLOGY

## 2023-02-16 PROCEDURE — 77063 MAMMO DIGITAL SCREENING BILAT WITH TOMO: ICD-10-PCS | Mod: 26,HCNC,, | Performed by: RADIOLOGY

## 2023-02-16 PROCEDURE — 77067 MAMMO DIGITAL SCREENING BILAT WITH TOMO: ICD-10-PCS | Mod: 26,HCNC,, | Performed by: RADIOLOGY

## 2023-02-16 PROCEDURE — 77067 SCR MAMMO BI INCL CAD: CPT | Mod: 26,HCNC,, | Performed by: RADIOLOGY

## 2023-02-16 PROCEDURE — 77067 SCR MAMMO BI INCL CAD: CPT | Mod: TC,HCNC,PO

## 2023-03-06 ENCOUNTER — TELEPHONE (OUTPATIENT)
Dept: NEUROLOGY | Facility: CLINIC | Age: 68
End: 2023-03-06
Payer: MEDICARE

## 2023-03-06 DIAGNOSIS — R56.9 SEIZURE-LIKE ACTIVITY: ICD-10-CM

## 2023-03-06 RX ORDER — LEVETIRACETAM 750 MG/1
TABLET ORAL
Qty: 60 TABLET | Refills: 6 | Status: SHIPPED | OUTPATIENT
Start: 2023-03-06 | End: 2023-07-18 | Stop reason: SDUPTHER

## 2023-03-06 NOTE — TELEPHONE ENCOUNTER
----- Message from Dony Agarwal sent at 3/6/2023 10:49 AM CST -----  Regarding: refill  Type:  RX Refill Request    Who Called:  Leny Estrada or New Rx:  refill    RX Name and Strength:    1. levETIRAcetam (KEPPRA) 750 MG Tab 60 tablet 6 7/29/2022       Sig: Take one tablet twice daily      Class: Phone In    How is the patient currently taking it? (ex. 1XDay):  see above  Is this a 30 day or 90 day RX:  see above    Preferred Pharmacy with phone number:    [id number] [place] [address] AT [facility] & [address] [address]  Phone: [phone] Fax: [phone]    Local or Mail Order:  local    Ordering Provider:  Dr Celina Sykes Call Back Number:  [phone]    Additional Information:

## 2023-03-13 ENCOUNTER — LAB VISIT (OUTPATIENT)
Dept: LAB | Facility: HOSPITAL | Age: 68
End: 2023-03-13
Attending: INTERNAL MEDICINE
Payer: MEDICARE

## 2023-03-13 DIAGNOSIS — E87.6 HYPOKALEMIA: ICD-10-CM

## 2023-03-13 LAB — POTASSIUM SERPL-SCNC: 4.1 MMOL/L (ref 3.5–5.1)

## 2023-03-13 PROCEDURE — 84132 ASSAY OF SERUM POTASSIUM: CPT | Mod: HCNC | Performed by: INTERNAL MEDICINE

## 2023-03-13 PROCEDURE — 36415 COLL VENOUS BLD VENIPUNCTURE: CPT | Mod: HCNC,PN | Performed by: INTERNAL MEDICINE

## 2023-07-07 DIAGNOSIS — G89.29 CHRONIC PAIN OF RIGHT KNEE: ICD-10-CM

## 2023-07-07 DIAGNOSIS — I10 ESSENTIAL HYPERTENSION: ICD-10-CM

## 2023-07-07 DIAGNOSIS — M25.561 CHRONIC PAIN OF RIGHT KNEE: ICD-10-CM

## 2023-07-07 RX ORDER — HYDROCHLOROTHIAZIDE 25 MG/1
25 TABLET ORAL DAILY
Qty: 90 TABLET | Refills: 3 | Status: SHIPPED | OUTPATIENT
Start: 2023-07-07

## 2023-07-07 RX ORDER — MELOXICAM 7.5 MG/1
TABLET ORAL
Qty: 90 TABLET | Refills: 1 | Status: SHIPPED | OUTPATIENT
Start: 2023-07-07 | End: 2024-01-04

## 2023-07-18 ENCOUNTER — OFFICE VISIT (OUTPATIENT)
Dept: NEUROLOGY | Facility: CLINIC | Age: 68
End: 2023-07-18
Payer: MEDICARE

## 2023-07-18 VITALS
DIASTOLIC BLOOD PRESSURE: 81 MMHG | BODY MASS INDEX: 36.75 KG/M2 | HEIGHT: 67 IN | HEART RATE: 75 BPM | SYSTOLIC BLOOD PRESSURE: 182 MMHG | WEIGHT: 234.13 LBS

## 2023-07-18 DIAGNOSIS — G40.909 NONINTRACTABLE EPILEPSY WITHOUT STATUS EPILEPTICUS, UNSPECIFIED EPILEPSY TYPE: Primary | ICD-10-CM

## 2023-07-18 DIAGNOSIS — R56.9 SEIZURE-LIKE ACTIVITY: ICD-10-CM

## 2023-07-18 PROCEDURE — 1126F PR PAIN SEVERITY QUANTIFIED, NO PAIN PRESENT: ICD-10-PCS | Mod: CPTII,S$GLB,, | Performed by: PSYCHIATRY & NEUROLOGY

## 2023-07-18 PROCEDURE — 3008F PR BODY MASS INDEX (BMI) DOCUMENTED: ICD-10-PCS | Mod: CPTII,S$GLB,, | Performed by: PSYCHIATRY & NEUROLOGY

## 2023-07-18 PROCEDURE — 1160F RVW MEDS BY RX/DR IN RCRD: CPT | Mod: CPTII,S$GLB,, | Performed by: PSYCHIATRY & NEUROLOGY

## 2023-07-18 PROCEDURE — 99999 PR PBB SHADOW E&M-EST. PATIENT-LVL III: ICD-10-PCS | Mod: PBBFAC,,, | Performed by: PSYCHIATRY & NEUROLOGY

## 2023-07-18 PROCEDURE — 3008F BODY MASS INDEX DOCD: CPT | Mod: CPTII,S$GLB,, | Performed by: PSYCHIATRY & NEUROLOGY

## 2023-07-18 PROCEDURE — 3079F DIAST BP 80-89 MM HG: CPT | Mod: CPTII,S$GLB,, | Performed by: PSYCHIATRY & NEUROLOGY

## 2023-07-18 PROCEDURE — 1101F PT FALLS ASSESS-DOCD LE1/YR: CPT | Mod: CPTII,S$GLB,, | Performed by: PSYCHIATRY & NEUROLOGY

## 2023-07-18 PROCEDURE — 3288F FALL RISK ASSESSMENT DOCD: CPT | Mod: CPTII,S$GLB,, | Performed by: PSYCHIATRY & NEUROLOGY

## 2023-07-18 PROCEDURE — 3079F PR MOST RECENT DIASTOLIC BLOOD PRESSURE 80-89 MM HG: ICD-10-PCS | Mod: CPTII,S$GLB,, | Performed by: PSYCHIATRY & NEUROLOGY

## 2023-07-18 PROCEDURE — 3288F PR FALLS RISK ASSESSMENT DOCUMENTED: ICD-10-PCS | Mod: CPTII,S$GLB,, | Performed by: PSYCHIATRY & NEUROLOGY

## 2023-07-18 PROCEDURE — 1101F PR PT FALLS ASSESS DOC 0-1 FALLS W/OUT INJ PAST YR: ICD-10-PCS | Mod: CPTII,S$GLB,, | Performed by: PSYCHIATRY & NEUROLOGY

## 2023-07-18 PROCEDURE — 99215 OFFICE O/P EST HI 40 MIN: CPT | Mod: S$GLB,,, | Performed by: PSYCHIATRY & NEUROLOGY

## 2023-07-18 PROCEDURE — 99215 PR OFFICE/OUTPT VISIT, EST, LEVL V, 40-54 MIN: ICD-10-PCS | Mod: S$GLB,,, | Performed by: PSYCHIATRY & NEUROLOGY

## 2023-07-18 PROCEDURE — 3077F SYST BP >= 140 MM HG: CPT | Mod: CPTII,S$GLB,, | Performed by: PSYCHIATRY & NEUROLOGY

## 2023-07-18 PROCEDURE — 99999 PR PBB SHADOW E&M-EST. PATIENT-LVL III: CPT | Mod: PBBFAC,,, | Performed by: PSYCHIATRY & NEUROLOGY

## 2023-07-18 PROCEDURE — 1159F MED LIST DOCD IN RCRD: CPT | Mod: CPTII,S$GLB,, | Performed by: PSYCHIATRY & NEUROLOGY

## 2023-07-18 PROCEDURE — 3077F PR MOST RECENT SYSTOLIC BLOOD PRESSURE >= 140 MM HG: ICD-10-PCS | Mod: CPTII,S$GLB,, | Performed by: PSYCHIATRY & NEUROLOGY

## 2023-07-18 PROCEDURE — 1159F PR MEDICATION LIST DOCUMENTED IN MEDICAL RECORD: ICD-10-PCS | Mod: CPTII,S$GLB,, | Performed by: PSYCHIATRY & NEUROLOGY

## 2023-07-18 PROCEDURE — 1160F PR REVIEW ALL MEDS BY PRESCRIBER/CLIN PHARMACIST DOCUMENTED: ICD-10-PCS | Mod: CPTII,S$GLB,, | Performed by: PSYCHIATRY & NEUROLOGY

## 2023-07-18 PROCEDURE — 1126F AMNT PAIN NOTED NONE PRSNT: CPT | Mod: CPTII,S$GLB,, | Performed by: PSYCHIATRY & NEUROLOGY

## 2023-07-18 RX ORDER — LEVETIRACETAM 750 MG/1
750 TABLET ORAL 2 TIMES DAILY
Qty: 180 TABLET | Refills: 3 | Status: SHIPPED | OUTPATIENT
Start: 2023-07-18

## 2023-07-18 NOTE — PROGRESS NOTES
Date: 7/18/2023    Patient ID: Leny Tucker is a 67 y.o. female.    Chief Complaint: Seizures (Last seizure 7/29/22)      History of Present Illness:  Ms. Tucker is a 67 y.o. female who presents to establish care for epilepsy. She previously followed with Dr. Patrick and now establishing with me.     Interval history: After her last appointment, Dr. Patrick didn't start seizure medication but told her if she had a subsequent events, he would start medication.     She had a seizure on 7/29/22 and keppra was started. She continues on 750 mg BID. No seizures since. She feels somewhat tired on keppra but can stay awake during the day but just has to go to bed earlier. Sometimes she is a little irritable but manages by going out to read. This is mild and tolerable.     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     EEG and MRI brain normal.     Prior history per Dr. Patrick's note from June 2022:  This occurred about a month ago.  The patient had no warning as this event occurred out of sleep.  There was no witness to the event.  Her  had gotten up around 3:30 or 4 AM.  The patient woke up at about 7:30.  She found herself covered in urine, and she had bitten her tongue.  The urine was cold, and the blood had clotted.  She felt out of sorts for about an hour or so after awakening.  The patient normally does not have issues waking up in the morning, so this feeling was unusual for her.  She reports no similar events previously.  She also denies periods of missing time, unexplained injuries, or involuntary movements.  She has no family history of seizures.    Her  does endorse her snoring.  She reports feeling well rested when she wakes up.  She denies daytime somnolence.     She was diagnosed with COVID 2 weeks ago.  This about 2 weeks after the event.  She was not feeling ill at the time of the event.     Potential Epilepsy Risk Factors:   Pregnancy/Labor/Delivery - none  Febrile seizures - none  Head injury   - none  CNS infection - none     Stroke - none  Family Hx of Sz - none     AED Treatments  Current  Keppra 750 mg BID     Prior treatments  Phenobarbital (as a teenager for spells of dizziness of uncertain etiology, stopped due to sedation, dizzy spells resolved decades ago)     Not tried  acetazolamide (Diamox, AZM)  Amantadine  brivitiracetam (Briviact, BRV)  cenobamate (Xcopri)  carbamazepine (Tegretol, CBZ)  clobazam (Onfi or Frizium, CLB)  ethosuximide (Zarontin, ESM)  eslicarbazine (Aptiom, ESL)  felbamate (felbatol, FBM)  gabapentin (Neurontin, GPN)  lacosamide (Vimpat, LCS)   lamotrigine (Lamictal, LTG)   levetiracetam (Keppra, LEV)  methsuximide (Celontin, MSM)  oxcarbazepine (Trileptal OXC)  perampanel (Fycompa, FCP)   phenytoin (Dilantin, PHT)  pregabalin (Lyrica, PGB)  primidone (Mysoline, PRM)  rufinamide (Banzel, RUF)  tiagabine (Gabatril,  TGB)  topiramate (Topamax, TPM)  viagabatrin, (Sabril, VGB)  vagal nerve stimulator (VNS)  valproic acid (Depakote, VPA)  zonisamide (Zonegran, ZNA)  Benzodiazepines  diazepam - rectal (Diastatl)  diazepam - oral (Valium, DZ)  clonazepam (Klonopin, CZP)  clorazepate (Tranxene, CLZ)  Ativan  Brain Stimulation  Vagal Nerve Stimulation-n/a  DBS- n/a    Allergies:  Review of patient's allergies indicates:   Allergen Reactions    Doxycycline     Augmentin [amoxicillin-pot clavulanate] Rash    Sulfa (sulfonamide antibiotics) Rash       Current Medications:  Current Outpatient Medications   Medication Sig Dispense Refill    calcium carbonate/vitamin D3 (CALCIUM 600 + D,3, ORAL) Take by mouth Daily.      cetirizine (ZYRTEC) 10 MG tablet Take 10 mg by mouth once daily.      diclofenac sodium (VOLTAREN) 1 % Gel Apply 2 g topically 4 (four) times daily. 100 g 2    famotidine (PEPCID) 20 MG tablet Take 1 tablet (20 mg total) by mouth 2 (two) times daily as needed for Heartburn. 30 tablet 1    fluticasone propionate (FLONASE) 50 mcg/actuation nasal spray SHAKE LIQUID AND USE 2  "SPRAYS(100 MCG) IN EACH NOSTRIL TWICE DAILY 96 g 3    hydroCHLOROthiazide (HYDRODIURIL) 25 MG tablet Take 1 tablet (25 mg total) by mouth once daily. 90 tablet 3    meloxicam (MOBIC) 7.5 MG tablet TAKE 1 TABLET BY MOUTH DAILY WITH FOOD AS NEEDED FOR PAIN Strength: 7.5 mg 90 tablet 1    multivitamin capsule Take 1 capsule by mouth once daily.      levETIRAcetam (KEPPRA) 750 MG Tab Take 1 tablet (750 mg total) by mouth 2 (two) times daily. 180 tablet 3    potassium chloride SA (K-DUR,KLOR-CON) 20 MEQ tablet Take 1 tablet (20 mEq total) by mouth 2 (two) times daily. 10 tablet 0     No current facility-administered medications for this visit.       Past Medical History:  Past Medical History:   Diagnosis Date    Allergy to pollen     GERD (gastroesophageal reflux disease)     Heel fracture 07/18/2022    left    Osteoarthritis (arthritis due to wear and tear of joints)     right knee    Osteopenia     Seizures        Past Surgical History:  Past Surgical History:   Procedure Laterality Date    BREAST BIOPSY Bilateral     neg    FINGER SURGERY      bone lesion    HYSTERECTOMY      LIPOMA RESECTION      biopsy negative    OOPHORECTOMY         Family History:  family history includes Arthritis in her mother; Heart disease in her father and mother; Hypertension in her brother and brother; Rheumatic fever in her mother.    Social History:   reports that she quit smoking about 30 years ago. Her smoking use included cigarettes. She has a 15.00 pack-year smoking history. She has never used smokeless tobacco. She reports that she does not drink alcohol and does not use drugs.    Physical Exam:  Vitals:    07/18/23 1303   BP: (!) 182/81   Pulse: 75   Weight: 106.2 kg (234 lb 2.1 oz)   Height: 5' 7" (1.702 m)   PainSc: 0-No pain     Body mass index is 36.67 kg/m².    Neurological Exam:  Mental status: Awake, alert.  Speech/Language: No dysarthria or aphasia on conversation.   Cranial nerves: Pupils equal round and reactive to " light, extraocular movements intact, facial strength intact bilaterally, hearing grossly intact bilaterally. Shoulder shrug normal bilaterally.   Motor: 5 out of 5 strength throughout the upper and lower extremities bilaterally. Normal bulk and tone.   Sensation: Intact to light touch and vibration bilaterally.  DTR: 2+ at the knees and biceps bilaterally.  Coordination: Finger-nose-finger testing and rapid alternating movements normal bilaterally. No tremor.   Gait: Normal gait but slight side stepping with tandem walking    Data:  I have personally reviewed the referring provider's notes, labs, & imaging made available to me today.     Labs:  CBC:   Lab Results   Component Value Date    WBC 8.25 02/16/2023    HGB 13.8 02/16/2023    HCT 41.6 02/16/2023     02/16/2023    MCV 95 02/16/2023    RDW 13.4 02/16/2023     BMP:   Lab Results   Component Value Date     02/16/2023    K 4.1 03/13/2023     02/16/2023    CO2 29 02/16/2023    BUN 13 02/16/2023    CREATININE 1.0 02/16/2023     02/16/2023    CALCIUM 8.8 02/16/2023    PHOS 3.9 11/17/2020     LFTS;   Lab Results   Component Value Date    PROT 6.5 02/16/2023    ALBUMIN 3.8 02/16/2023    BILITOT 0.6 02/16/2023    AST 20 02/16/2023    ALKPHOS 85 02/16/2023    ALT 23 02/16/2023     COAGS: No results found for: INR, PROTIME, PTT  FLP:   Lab Results   Component Value Date    CHOL 182 02/16/2023    HDL 62 02/16/2023    LDLCALC 102.6 02/16/2023    TRIG 87 02/16/2023    CHOLHDL 34.1 02/16/2023       Imaging:  I have personally reviewed the imaging, MRI brain is normal    Assessment and Plan:  Ms. Tucker is a 67 y.o. female here for epilepsy, idiopathic. EEG and MRI brain normal. Will continue keppra 750 mg BID. She has some side effects but tolerable per her report.     Patient was counseled on seizure safety including driving laws and water safety.  reviewed the importance of adequate sleep, compliance with medication, and limiting medications that  may lower the seizure threshold.  All questions were answered.      Nonintractable epilepsy without status epilepticus, unspecified epilepsy type    Seizure-like activity  -     levETIRAcetam (KEPPRA) 750 MG Tab; Take 1 tablet (750 mg total) by mouth 2 (two) times daily.  Dispense: 180 tablet; Refill: 3         I spent a total of 40 minutes on the day of the visit.This includes face to face time and non-face to face time preparing to see the patient (eg, review of tests), Obtaining and/or reviewing separately obtained history, Documenting clinical information in the electronic or other health record, Independently interpreting results and communicating results to the patient/family/caregiver, or Care coordination.

## 2023-07-18 NOTE — PATIENT INSTRUCTIONS
During a seizure:  - Ensure the person is in a safe place, remove hard or sharp objects from the area  - Turn the person on their side  - Never force anything into their mouth  - Keep on eye on the time, if the jerking/shaking/tensing of the muscles lasts > 5 minutes, call 911.    After a seizure:  - Allow them to lie quietly, gently call them to see if they wake up  - If there is injury or if they are not waking up within 5 minutes, call 911    Safety:  - Take showers, not baths  - Take care when around bodies of water (swimming pools, lakes, etc) and wear protective equipment. Do not swim alone  - Use equipment with automatic shutoff safety features  - Do not climb ladders or use heavy machinery until seizure-free for 6 months  - Use the back burners when cooking  - If you have children, change diapers on the floor and avoid holding them while taking stairs  - Do not drive until seizure-free for 6 months    Triggers:  - Be sure to take you medication as scheduled without missed doses  - Be sure to get 8 hours of sleep each night  - Certain medications to avoid:   - Benadryl (diphenhydramine)   - Tramadol (Ultram)   - Certain antibiotics in the fluoroquinolone class (levaquin or cipro)   - Wellbutrin    - Chantix   - Alcohol   - Other illegal drugs or stimulant medications  - Herbal supplements to avoid that can lower the seizure threshold:   - Asafoetida, Borage, Ephedra, Ergot, Eucalyptus, Evening primrose, Ginkgo biloba, Ginseng, Pennyroyal, Jose, Shankpushpi, Star anise, Star fruit, Wormwood, and Yohimbine

## 2024-01-03 DIAGNOSIS — G89.29 CHRONIC PAIN OF RIGHT KNEE: ICD-10-CM

## 2024-01-03 DIAGNOSIS — M25.561 CHRONIC PAIN OF RIGHT KNEE: ICD-10-CM

## 2024-01-04 RX ORDER — MELOXICAM 7.5 MG/1
TABLET ORAL
Qty: 90 TABLET | Refills: 0 | Status: SHIPPED | OUTPATIENT
Start: 2024-01-04

## 2024-04-03 DIAGNOSIS — M25.561 CHRONIC PAIN OF RIGHT KNEE: ICD-10-CM

## 2024-04-03 DIAGNOSIS — G89.29 CHRONIC PAIN OF RIGHT KNEE: ICD-10-CM

## 2024-04-03 RX ORDER — MELOXICAM 7.5 MG/1
TABLET ORAL
Qty: 90 TABLET | Refills: 0 | Status: SHIPPED | OUTPATIENT
Start: 2024-04-03

## 2024-06-18 ENCOUNTER — TELEPHONE (OUTPATIENT)
Dept: NEUROLOGY | Facility: CLINIC | Age: 69
End: 2024-06-18
Payer: MEDICARE

## 2024-06-18 NOTE — TELEPHONE ENCOUNTER
----- Message from Latrice Alas sent at 6/18/2024 11:54 AM CDT -----  Contact: Self  Type:  Sooner Appointment Request    Caller is requesting a sooner appointment.  Caller declined first available appointment listed below.  Caller will not accept being placed on the waitlist and is requesting a message be sent to doctor.    Name of Caller:  Patient  When is the first available appointment?  N/A  Symptoms:  1 yr f/u for seizures  Would the patient rather a call back or a response via [facility]? Call  Best Call Back Number:  [phone]  Additional Information:  Pt stated she is due back in for her annual in July. Thank You

## 2024-06-27 DIAGNOSIS — I10 ESSENTIAL HYPERTENSION: ICD-10-CM

## 2024-06-27 RX ORDER — HYDROCHLOROTHIAZIDE 25 MG/1
25 TABLET ORAL
Qty: 90 TABLET | Refills: 3 | OUTPATIENT
Start: 2024-06-27

## 2024-07-23 ENCOUNTER — OFFICE VISIT (OUTPATIENT)
Dept: NEUROLOGY | Facility: CLINIC | Age: 69
End: 2024-07-23
Payer: MEDICARE

## 2024-07-23 VITALS
WEIGHT: 230.81 LBS | BODY MASS INDEX: 36.22 KG/M2 | DIASTOLIC BLOOD PRESSURE: 84 MMHG | HEART RATE: 83 BPM | SYSTOLIC BLOOD PRESSURE: 153 MMHG | HEIGHT: 67 IN

## 2024-07-23 DIAGNOSIS — R56.9 SEIZURE-LIKE ACTIVITY: ICD-10-CM

## 2024-07-23 DIAGNOSIS — G40.909 NONINTRACTABLE EPILEPSY WITHOUT STATUS EPILEPTICUS, UNSPECIFIED EPILEPSY TYPE: Primary | ICD-10-CM

## 2024-07-23 PROCEDURE — 99999 PR PBB SHADOW E&M-EST. PATIENT-LVL III: CPT | Mod: PBBFAC,,,

## 2024-07-23 RX ORDER — LEVETIRACETAM 750 MG/1
750 TABLET ORAL 2 TIMES DAILY
Qty: 180 TABLET | Refills: 3 | Status: SHIPPED | OUTPATIENT
Start: 2024-07-23

## 2024-07-23 NOTE — PROGRESS NOTES
Date: 7/23/2024    Patient ID: Leny Tucker is a 68 y.o. female.    Chief Complaint: Seizures      History of Present Illness:  Ms. Tucker is a 68 y.o. female who presents for follow up of epilepsy.     Interval history: Patient is new to me. Patient has continued on Keppra 750 mg BID without side effects. Last seizure was July 2022.     Prior History from Dr. Monahan 7/18/2023:   She had a seizure on 7/29/22 and keppra was started. She continues on 750 mg BID. No seizures since. She feels somewhat tired on keppra but can stay awake during the day but just has to go to bed earlier. Sometimes she is a little irritable but manages by going out to read. This is mild and tolerable.     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     EEG and MRI brain normal.     Prior history per Dr. Patrick's note from June 2022:  This occurred about a month ago.  The patient had no warning as this event occurred out of sleep.  There was no witness to the event.  Her  had gotten up around 3:30 or 4 AM.  The patient woke up at about 7:30.  She found herself covered in urine, and she had bitten her tongue.  The urine was cold, and the blood had clotted.  She felt out of sorts for about an hour or so after awakening.  The patient normally does not have issues waking up in the morning, so this feeling was unusual for her.  She reports no similar events previously.  She also denies periods of missing time, unexplained injuries, or involuntary movements.  She has no family history of seizures.    Her  does endorse her snoring.  She reports feeling well rested when she wakes up.  She denies daytime somnolence.     She was diagnosed with COVID 2 weeks ago.  This about 2 weeks after the event. She was not feeling ill at the time of the event.     Potential Epilepsy Risk Factors:   Pregnancy/Labor/Delivery - none  Febrile seizures - none  Head injury  - none  CNS infection - none     Stroke - none  Family Hx of Sz - none     AED  Treatments  Current  Keppra 750 mg BID     Prior treatments  Phenobarbital (as a teenager for spells of dizziness of uncertain etiology, stopped due to sedation, dizzy spells resolved decades ago)     Not tried  acetazolamide (Diamox, AZM)  Amantadine  brivitiracetam (Briviact, BRV)  cenobamate (Xcopri)  carbamazepine (Tegretol, CBZ)  clobazam (Onfi or Frizium, CLB)  ethosuximide (Zarontin, ESM)  eslicarbazine (Aptiom, ESL)  felbamate (felbatol, FBM)  gabapentin (Neurontin, GPN)  lacosamide (Vimpat, LCS)   lamotrigine (Lamictal, LTG)   levetiracetam (Keppra, LEV)  methsuximide (Celontin, MSM)  oxcarbazepine (Trileptal OXC)  perampanel (Fycompa, FCP)   phenytoin (Dilantin, PHT)  pregabalin (Lyrica, PGB)  primidone (Mysoline, PRM)  rufinamide (Banzel, RUF)  tiagabine (Gabatril,  TGB)  topiramate (Topamax, TPM)  viagabatrin, (Sabril, VGB)  vagal nerve stimulator (VNS)  valproic acid (Depakote, VPA)  zonisamide (Zonegran, ZNA)  Benzodiazepines  diazepam - rectal (Diastatl)  diazepam - oral (Valium, DZ)  clonazepam (Klonopin, CZP)  clorazepate (Tranxene, CLZ)  Ativan  Brain Stimulation  Vagal Nerve Stimulation-n/a  DBS- n/a    Allergies:  Review of patient's allergies indicates:   Allergen Reactions    Doxycycline     Augmentin [amoxicillin-pot clavulanate] Rash    Sulfa (sulfonamide antibiotics) Rash       Current Medications:  Current Outpatient Medications   Medication Sig Dispense Refill    calcium carbonate/vitamin D3 (CALCIUM 600 + D,3, ORAL) Take by mouth Daily.      cetirizine (ZYRTEC) 10 MG tablet Take 10 mg by mouth once daily.      famotidine (PEPCID) 20 MG tablet Take 1 tablet (20 mg total) by mouth 2 (two) times daily as needed for Heartburn. 30 tablet 1    fluticasone propionate (FLONASE) 50 mcg/actuation nasal spray SHAKE LIQUID AND USE 2 SPRAYS(100 MCG) IN EACH NOSTRIL TWICE DAILY 96 g 3    hydroCHLOROthiazide (HYDRODIURIL) 25 MG tablet Take 1 tablet (25 mg total) by mouth once daily. 90 tablet 3     "meloxicam (MOBIC) 7.5 MG tablet TAKE 1 TABLET BY MOUTH EVERY DAY WITH FOOD AS NEEDED FOR PAIN 90 tablet 0    multivitamin capsule Take 1 capsule by mouth once daily.      diclofenac sodium (VOLTAREN) 1 % Gel Apply 2 g topically 4 (four) times daily. 100 g 2    levETIRAcetam (KEPPRA) 750 MG Tab Take 1 tablet (750 mg total) by mouth 2 (two) times daily. 180 tablet 3     No current facility-administered medications for this visit.       Past Medical History:  Past Medical History:   Diagnosis Date    Allergy to pollen     GERD (gastroesophageal reflux disease)     Heel fracture 2022    left    Osteoarthritis (arthritis due to wear and tear of joints)     right knee    Osteopenia     Seizures        Past Surgical History:  Past Surgical History:   Procedure Laterality Date    BREAST BIOPSY Bilateral     neg     SECTION      FINGER SURGERY      bone lesion    HYSTERECTOMY      LIPOMA RESECTION      biopsy negative    OOPHORECTOMY         Family History:  family history includes Arthritis in her mother; Heart disease in her father and mother; Hypertension in her brother and brother; Rheumatic fever in her mother.    Social History:   reports that she quit smoking about 31 years ago. Her smoking use included cigarettes. She started smoking about 41 years ago. She has a 15 pack-year smoking history. She has never been exposed to tobacco smoke. She has never used smokeless tobacco. She reports that she does not drink alcohol and does not use drugs.    Physical Exam:  Vitals:    24 0946   BP: (!) 153/84   Pulse: 83   Weight: 104.7 kg (230 lb 13.2 oz)   Height: 5' 7" (1.702 m)   PainSc: 0-No pain       Body mass index is 36.15 kg/m².    Neurological Exam:  Mental status: Awake, alert, and oriented. Speech fluent without dysarthria or aphasia.  Recent and remote memory appear to be intact.  Fund of knowledge normal.  Cranial nerves: Pupils equal round and reactive to light, extraocular movements intact, " "facial strength and sensation intact bilaterally, palate and tongue midline, hearing grossly intact bilaterally. Shoulder shrug with full strength bilaterally.   Motor: 5/5 strength throughout the upper and lower extremities bilaterally. Normal bulk and tone.  Sensation: Intact to light touch, temperature, and vibration bilaterally.  DTR: 2+ at the biceps, brachioradialis, knees, and ankles bilaterally.  Coordination: Finger-nose-finger testing and rapid alternating movements normal bilaterally. No ataxia.  Gait: Normal gait including heel, toe, and tandem walking.     Data:  I have personally reviewed the referring provider's notes, labs, & imaging made available to me today.     Labs:  CBC:   Lab Results   Component Value Date    WBC 5.31 02/16/2024    HGB 13.6 02/16/2024    HCT 40.8 02/16/2024     02/16/2024    MCV 94 02/16/2024    RDW 13.5 02/16/2024     BMP:   Lab Results   Component Value Date     02/16/2024    K 3.6 02/16/2024     02/16/2024    CO2 31 02/16/2024    BUN 18 02/16/2024    CREATININE 0.82 02/16/2024     02/16/2024    CALCIUM 9.5 02/16/2024    PHOS 3.9 11/17/2020     LFTS;   Lab Results   Component Value Date    PROT 6.7 02/16/2024    ALBUMIN 4.3 02/16/2024    BILITOT 0.7 02/16/2024    AST 35 02/16/2024    ALKPHOS 81 02/16/2024    ALT 31 02/16/2024     COAGS: No results found for: "INR", "PROTIME", "PTT"  FLP:   Lab Results   Component Value Date    CHOL 206 (H) 02/16/2024    HDL 59 02/16/2024    LDLCALC 130.8 02/16/2024    TRIG 81 02/16/2024    CHOLHDL 28.6 02/16/2024       Imaging:  MRI 5/27/2022:   Impression:  1. There is no acute abnormality.  There is no intracranial hemorrhage, mass/mass effect, acute edema or ischemia.    Personally reviewed MRI.     Assessment and Plan:  Ms. Tucker is a 68 y.o. female here for idiopathic epilepsy. She has continued on Keppra 750 mg BID and tolerating well without side effects. The drowsiness that she experienced previously has " improved. CMP 2/2024 with normal kidney function. Will continue Keppra at current dose.     Patient was counseled on seizure safety including driving laws and water safety.  reviewed the importance of adequate sleep, compliance with medication, and limiting medications that may lower the seizure threshold.  All questions were answered.      Nonintractable epilepsy without status epilepticus, unspecified epilepsy type  -     levETIRAcetam (KEPPRA) 750 MG Tab; Take 1 tablet (750 mg total) by mouth 2 (two) times daily.  Dispense: 180 tablet; Refill: 3    Seizure-like activity

## 2024-07-23 NOTE — PATIENT INSTRUCTIONS
Avoid:  - Benadryl (diphenhydramine)  - estrogens  - Tramadol (Ultram)  - Certain antibiotics in the fluoroquinolone class (levaquin or cipro)  - Wellbutrin   - Chantix  - Avoid more than 2 drinks of alcohol  - Drugs or stimulant medications      Follow up in 1 year or sooner.

## 2025-02-04 DIAGNOSIS — G40.909 NONINTRACTABLE EPILEPSY WITHOUT STATUS EPILEPTICUS, UNSPECIFIED EPILEPSY TYPE: ICD-10-CM

## 2025-02-04 RX ORDER — LEVETIRACETAM 750 MG/1
750 TABLET ORAL 2 TIMES DAILY
Qty: 180 TABLET | Refills: 1 | Status: SHIPPED | OUTPATIENT
Start: 2025-02-04

## 2025-06-20 DIAGNOSIS — I10 ESSENTIAL HYPERTENSION: ICD-10-CM

## 2025-06-20 RX ORDER — HYDROCHLOROTHIAZIDE 25 MG/1
25 TABLET ORAL
Qty: 90 TABLET | Refills: 3 | OUTPATIENT
Start: 2025-06-20

## 2025-07-24 ENCOUNTER — OFFICE VISIT (OUTPATIENT)
Dept: NEUROLOGY | Facility: CLINIC | Age: 70
End: 2025-07-24
Payer: MEDICARE

## 2025-07-24 VITALS
DIASTOLIC BLOOD PRESSURE: 9 MMHG | WEIGHT: 228.63 LBS | HEIGHT: 67 IN | BODY MASS INDEX: 35.88 KG/M2 | SYSTOLIC BLOOD PRESSURE: 149 MMHG | HEART RATE: 75 BPM

## 2025-07-24 DIAGNOSIS — G40.909 NONINTRACTABLE EPILEPSY WITHOUT STATUS EPILEPTICUS, UNSPECIFIED EPILEPSY TYPE: ICD-10-CM

## 2025-07-24 PROCEDURE — 99214 OFFICE O/P EST MOD 30 MIN: CPT | Mod: S$GLB,,, | Performed by: PSYCHIATRY & NEUROLOGY

## 2025-07-24 PROCEDURE — 1126F AMNT PAIN NOTED NONE PRSNT: CPT | Mod: CPTII,S$GLB,, | Performed by: PSYCHIATRY & NEUROLOGY

## 2025-07-24 PROCEDURE — 3008F BODY MASS INDEX DOCD: CPT | Mod: CPTII,S$GLB,, | Performed by: PSYCHIATRY & NEUROLOGY

## 2025-07-24 PROCEDURE — 3078F DIAST BP <80 MM HG: CPT | Mod: CPTII,S$GLB,, | Performed by: PSYCHIATRY & NEUROLOGY

## 2025-07-24 PROCEDURE — 1159F MED LIST DOCD IN RCRD: CPT | Mod: CPTII,S$GLB,, | Performed by: PSYCHIATRY & NEUROLOGY

## 2025-07-24 PROCEDURE — 1160F RVW MEDS BY RX/DR IN RCRD: CPT | Mod: CPTII,S$GLB,, | Performed by: PSYCHIATRY & NEUROLOGY

## 2025-07-24 PROCEDURE — G2211 COMPLEX E/M VISIT ADD ON: HCPCS | Mod: S$GLB,,, | Performed by: PSYCHIATRY & NEUROLOGY

## 2025-07-24 PROCEDURE — 1101F PT FALLS ASSESS-DOCD LE1/YR: CPT | Mod: CPTII,S$GLB,, | Performed by: PSYCHIATRY & NEUROLOGY

## 2025-07-24 PROCEDURE — 3288F FALL RISK ASSESSMENT DOCD: CPT | Mod: CPTII,S$GLB,, | Performed by: PSYCHIATRY & NEUROLOGY

## 2025-07-24 PROCEDURE — 3077F SYST BP >= 140 MM HG: CPT | Mod: CPTII,S$GLB,, | Performed by: PSYCHIATRY & NEUROLOGY

## 2025-07-24 PROCEDURE — 99999 PR PBB SHADOW E&M-EST. PATIENT-LVL III: CPT | Mod: PBBFAC,,, | Performed by: PSYCHIATRY & NEUROLOGY

## 2025-07-24 RX ORDER — LEVETIRACETAM 750 MG/1
750 TABLET ORAL 2 TIMES DAILY
Qty: 180 TABLET | Refills: 3 | Status: SHIPPED | OUTPATIENT
Start: 2025-07-24

## 2025-07-24 NOTE — PROGRESS NOTES
Date: 7/24/2025    Patient ID: Leny Tucker is a 69 y.o. female.    Chief Complaint: Seizures and Follow-up      History of Present Illness:  Ms. Tucker is a 69 y.o. female who presents for followup of epilepsy. She previously followed with Dr. Patrick.      Interval history: Last seen by me in July 2023 and Laney Fernandez NP, in July 2024. Since that time she continues on keppra 750 mg BID. No further seizures, last was July 2022.     Bloodwork in April 2025 showed normal renal function.     She had a thyroid nodule found earlier this year and they are planning to followup on it.     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 She falls back asleep quickly. She thinks it may be the heat waking her up at night. She has not had a sleep study before. Her  tells her she snores occasionally.      Workup:  EEG and MRI brain normal.      Prior history per Dr. Patrick's note from June 2022:  This occurred about a month ago.  The patient had no warning as this event occurred out of sleep.  There was no witness to the event.  Her  had gotten up around 3:30 or 4 AM.  The patient woke up at about 7:30.  She found herself covered in urine, and she had bitten her tongue.  The urine was cold, and the blood had clotted.  She felt out of sorts for about an hour or so after awakening.  The patient normally does not have issues waking up in the morning, so this feeling was unusual for her.  She reports no similar events previously.  She also denies periods of missing time, unexplained injuries, or involuntary movements.  She has no family history of seizures.    Her  does endorse her snoring.  She reports feeling well rested when she wakes up.  She denies daytime somnolence.     She was diagnosed with COVID 2 weeks ago.  This about 2 weeks after the event.  She was not feeling ill at the time of the event.     Potential Epilepsy Risk  Factors:   Pregnancy/Labor/Delivery - none  Febrile seizures - none  Head injury  - none  CNS infection - none     Stroke - none  Family Hx of Sz - none     AED Treatments  Current  Keppra 750 mg BID     Prior treatments  Phenobarbital (as a teenager for spells of dizziness of uncertain etiology, stopped due to sedation, dizzy spells resolved decades ago)     Not tried  acetazolamide (Diamox, AZM)  Amantadine  brivitiracetam (Briviact, BRV)  cenobamate (Xcopri)  carbamazepine (Tegretol, CBZ)  clobazam (Onfi or Frizium, CLB)  ethosuximide (Zarontin, ESM)  eslicarbazine (Aptiom, ESL)  felbamate (felbatol, FBM)  gabapentin (Neurontin, GPN)  lacosamide (Vimpat, LCS)   lamotrigine (Lamictal, LTG)   levetiracetam (Keppra, LEV)  methsuximide (Celontin, MSM)  oxcarbazepine (Trileptal OXC)  perampanel (Fycompa, FCP)   phenytoin (Dilantin, PHT)  pregabalin (Lyrica, PGB)  primidone (Mysoline, PRM)  rufinamide (Banzel, RUF)  tiagabine (Gabatril,  TGB)  topiramate (Topamax, TPM)  viagabatrin, (Sabril, VGB)  vagal nerve stimulator (VNS)  valproic acid (Depakote, VPA)  zonisamide (Zonegran, ZNA)  Benzodiazepines  diazepam - rectal (Diastatl)  diazepam - oral (Valium, DZ)  clonazepam (Klonopin, CZP)  clorazepate (Tranxene, CLZ)  Ativan  Brain Stimulation  Vagal Nerve Stimulation-n/a  DBS- n/a    Allergies:  Review of patient's allergies indicates:   Allergen Reactions    Doxycycline     Augmentin [amoxicillin-pot clavulanate] Rash    Sulfa (sulfonamide antibiotics) Rash       Current Medications:  Current Medications[1]    Past Medical History:  Past Medical History:   Diagnosis Date    Allergy to pollen     GERD (gastroesophageal reflux disease)     Heel fracture 2022    left    Osteoarthritis (arthritis due to wear and tear of joints)     right knee    Osteopenia     Seizures        Past Surgical History:  Past Surgical History:   Procedure Laterality Date    BREAST BIOPSY Bilateral     neg     SECTION  6/99     "FINGER SURGERY      bone lesion    HYSTERECTOMY      LIPOMA RESECTION      biopsy negative    OOPHORECTOMY         Family History:  family history includes Arthritis in her mother; Heart disease in her father and mother; Hypertension in her brother and brother; Rheumatic fever in her mother.    Social History:   reports that she quit smoking about 32 years ago. Her smoking use included cigarettes. She started smoking about 42 years ago. She has a 15 pack-year smoking history. She has never been exposed to tobacco smoke. She has never used smokeless tobacco. She reports that she does not drink alcohol and does not use drugs.    Physical Exam:  Vitals:    07/24/25 1102   BP: (!) 149/9   Pulse: 75   Weight: 103.7 kg (228 lb 9.9 oz)   Height: 5' 7" (1.702 m)   PainSc: 0-No pain     Body mass index is 35.81 kg/m².    Neurological Exam:  Mental status: Awake and alert  Speech language: No dysarthria or aphasia on conversation  Cranial nerves: Face symmetric  Motor: Moves all extremities well  Sensory: intact to vibratory sense in all 4 extremities distally  Coordination: No ataxia. No tremor.      Data:  I have personally reviewed other provider's notes, labs, & imaging made available to me today.     Labs:  CBC:   Lab Results   Component Value Date    WBC 5.35 04/29/2025    HGB 13.5 04/29/2025    HCT 40.4 04/29/2025     04/29/2025    MCV 94 04/29/2025    RDW 13.2 04/29/2025     BMP:   Lab Results   Component Value Date     04/29/2025    K 3.6 04/29/2025     04/29/2025    CO2 29 04/29/2025    BUN 17 04/29/2025    CREATININE 0.93 04/29/2025    GLU 90 04/29/2025    CALCIUM 8.7 04/29/2025    PHOS 3.9 11/17/2020     LFTS;   Lab Results   Component Value Date    PROT 6.5 04/29/2025    ALBUMIN 4.1 04/29/2025    BILITOT 0.7 04/29/2025    AST 26 04/29/2025    ALKPHOS 79 04/29/2025    ALT 27 04/29/2025     COAGS: No results found for: "INR", "PROTIME", "PTT"  FLP:   Lab Results   Component Value Date    CHOL 202 " (H) 04/29/2025    HDL 60 04/29/2025    LDLCALC 126.2 04/29/2025    TRIG 79 04/29/2025    CHOLHDL 29.7 04/29/2025       Imaging:  I have personally reviewed the imaging, MRI brain is normal.     Assessment and Plan:  Ms. Tucker is a 69 y.o. female here for followup of epilepsy. Discussed that I would recommend she remain on medication due to risk of seizure coming off medication. She is in agreement. Will continue keppra 750 mg BID. Renal function normal.     Discussed medication that can provoke seizures and how to avoid them. Discussed driving law.       Consider sleep study if sleep disturbances continue.     Nonintractable epilepsy without status epilepticus, unspecified epilepsy type  -     levETIRAcetam (KEPPRA) 750 MG Tab; Take 1 tablet (750 mg total) by mouth 2 (two) times daily.  Dispense: 180 tablet; Refill: 3       Visit today is associated with current or anticipated ongoing medical care related to this patient's single serious condition/complex condition (epilepsy). Plan to followup in 12 months for ongoing management.          [1]   Current Outpatient Medications   Medication Sig Dispense Refill    calcium carbonate/vitamin D3 (CALCIUM 600 + D,3, ORAL) Take by mouth Daily.      cetirizine (ZYRTEC) 10 MG tablet Take 10 mg by mouth once daily.      famotidine (PEPCID) 20 MG tablet Take 1 tablet (20 mg total) by mouth 2 (two) times daily as needed for Heartburn. 30 tablet 1    fluticasone propionate (FLONASE) 50 mcg/actuation nasal spray SHAKE LIQUID AND USE 2 SPRAYS(100 MCG) IN EACH NOSTRIL TWICE DAILY 96 g 3    hydroCHLOROthiazide (HYDRODIURIL) 25 MG tablet TAKE 1 TABLET(25 MG) BY MOUTH DAILY 90 tablet 3    meloxicam (MOBIC) 7.5 MG tablet TAKE 1 TABLET BY MOUTH EVERY DAY WITH FOOD AS NEEDED FOR PAIN 90 tablet 3    multivitamin capsule Take 1 capsule by mouth once daily.      diclofenac sodium (VOLTAREN) 1 % Gel Apply 2 g topically 4 (four) times daily. 100 g 2    levETIRAcetam (KEPPRA) 750 MG Tab Take 1  tablet (750 mg total) by mouth 2 (two) times daily. 180 tablet 3     No current facility-administered medications for this visit.

## 2025-07-24 NOTE — PATIENT INSTRUCTIONS
Avoid:  - Benadryl (diphenhydramine)  - estrogens  - Tramadol (Ultram)  - Certain antibiotics in the fluoroquinolone class (levaquin or cipro)  - Wellbutrin   - Chantix  - Avoid more than 2 drinks of alcohol  - Drugs or stimulant medications     Epilepsy Cold Treatments    Fine to Take:  Flonase  Saline nasal spray  Tylenol/ibuprofen  Guaifenesin     May lower seizure threshold:  Phenylephrine  Claritin, Zyrtec, other antihistamines  Dextromethorphan    DO NOT TAKE:  Benadryl (diphenhydramine)  Pseudoephedrine  If you need antibiotic, do not take levaquin or ciprofloxacin